# Patient Record
Sex: MALE | Race: WHITE | Employment: FULL TIME | URBAN - METROPOLITAN AREA
[De-identification: names, ages, dates, MRNs, and addresses within clinical notes are randomized per-mention and may not be internally consistent; named-entity substitution may affect disease eponyms.]

---

## 2017-02-23 RX ORDER — SIMVASTATIN 10 MG
TABLET ORAL
Qty: 30 TABLET | Refills: 0 | Status: SHIPPED | OUTPATIENT
Start: 2017-02-23 | End: 2017-03-13

## 2017-02-23 RX ORDER — CLOPIDOGREL BISULFATE 75 MG/1
TABLET ORAL
Qty: 30 TABLET | Refills: 0 | Status: SHIPPED | OUTPATIENT
Start: 2017-02-23 | End: 2017-03-13

## 2017-02-23 RX ORDER — LISINOPRIL AND HYDROCHLOROTHIAZIDE 20; 12.5 MG/1; MG/1
TABLET ORAL
Qty: 30 TABLET | Refills: 0 | Status: SHIPPED | OUTPATIENT
Start: 2017-02-23 | End: 2017-03-13

## 2017-03-13 ENCOUNTER — OFFICE VISIT (OUTPATIENT)
Dept: INTERNAL MEDICINE CLINIC | Facility: CLINIC | Age: 57
End: 2017-03-13

## 2017-03-13 ENCOUNTER — LAB ENCOUNTER (OUTPATIENT)
Dept: LAB | Age: 57
End: 2017-03-13
Attending: INTERNAL MEDICINE
Payer: COMMERCIAL

## 2017-03-13 VITALS
HEART RATE: 76 BPM | BODY MASS INDEX: 25.19 KG/M2 | HEIGHT: 72 IN | WEIGHT: 186 LBS | SYSTOLIC BLOOD PRESSURE: 148 MMHG | DIASTOLIC BLOOD PRESSURE: 85 MMHG

## 2017-03-13 DIAGNOSIS — Z00.00 ANNUAL PHYSICAL EXAM: ICD-10-CM

## 2017-03-13 DIAGNOSIS — F17.200 SMOKER: ICD-10-CM

## 2017-03-13 DIAGNOSIS — N40.1 BPH WITH OBSTRUCTION/LOWER URINARY TRACT SYMPTOMS: ICD-10-CM

## 2017-03-13 DIAGNOSIS — N13.8 BPH WITH OBSTRUCTION/LOWER URINARY TRACT SYMPTOMS: ICD-10-CM

## 2017-03-13 DIAGNOSIS — Z86.73 HISTORY OF CVA (CEREBROVASCULAR ACCIDENT): ICD-10-CM

## 2017-03-13 DIAGNOSIS — I10 ESSENTIAL HYPERTENSION: ICD-10-CM

## 2017-03-13 DIAGNOSIS — G81.91 RIGHT HEMIPARESIS (HCC): ICD-10-CM

## 2017-03-13 DIAGNOSIS — E11.40 TYPE 2 DIABETES MELLITUS WITH DIABETIC NEUROPATHY, WITHOUT LONG-TERM CURRENT USE OF INSULIN (HCC): ICD-10-CM

## 2017-03-13 DIAGNOSIS — E78.00 HYPERCHOLESTEROLEMIA: ICD-10-CM

## 2017-03-13 DIAGNOSIS — R73.01 IFG (IMPAIRED FASTING GLUCOSE): Primary | ICD-10-CM

## 2017-03-13 LAB
ALBUMIN SERPL BCP-MCNC: 4.4 G/DL (ref 3.5–4.8)
ALBUMIN/GLOB SERPL: 1.3 {RATIO} (ref 1–2)
ALP SERPL-CCNC: 88 U/L (ref 32–100)
ALT SERPL-CCNC: 22 U/L (ref 17–63)
ANION GAP SERPL CALC-SCNC: 8 MMOL/L (ref 0–18)
AST SERPL-CCNC: 20 U/L (ref 15–41)
BACTERIA UR QL AUTO: NEGATIVE /HPF
BASOPHILS # BLD: 0.1 K/UL (ref 0–0.2)
BASOPHILS NFR BLD: 1 %
BILIRUB SERPL-MCNC: 0.5 MG/DL (ref 0.3–1.2)
BUN SERPL-MCNC: 13 MG/DL (ref 8–20)
BUN/CREAT SERPL: 14.8 (ref 10–20)
CALCIUM SERPL-MCNC: 9.6 MG/DL (ref 8.5–10.5)
CHLORIDE SERPL-SCNC: 105 MMOL/L (ref 95–110)
CHOLEST SERPL-MCNC: 130 MG/DL (ref 110–200)
CO2 SERPL-SCNC: 27 MMOL/L (ref 22–32)
CREAT SERPL-MCNC: 0.88 MG/DL (ref 0.5–1.5)
CREAT UR-MCNC: 46.3 MG/DL
EOSINOPHIL # BLD: 0.2 K/UL (ref 0–0.7)
EOSINOPHIL NFR BLD: 3 %
ERYTHROCYTE [DISTWIDTH] IN BLOOD BY AUTOMATED COUNT: 13.6 % (ref 11–15)
GLOBULIN PLAS-MCNC: 3.3 G/DL (ref 2.5–3.7)
GLUCOSE SERPL-MCNC: 111 MG/DL (ref 70–99)
HCT VFR BLD AUTO: 42.8 % (ref 41–52)
HDLC SERPL-MCNC: 39 MG/DL
HGB BLD-MCNC: 14.4 G/DL (ref 13.5–17.5)
LDLC SERPL CALC-MCNC: 71 MG/DL (ref 0–99)
LYMPHOCYTES # BLD: 2.1 K/UL (ref 1–4)
LYMPHOCYTES NFR BLD: 27 %
MCH RBC QN AUTO: 29.3 PG (ref 27–32)
MCHC RBC AUTO-ENTMCNC: 33.7 G/DL (ref 32–37)
MCV RBC AUTO: 86.9 FL (ref 80–100)
MICROALBUMIN UR-MCNC: 0 MG/DL (ref 0–1.8)
MICROALBUMIN/CREAT UR: 0 MG/G{CREAT} (ref 0–20)
MONOCYTES # BLD: 0.5 K/UL (ref 0–1)
MONOCYTES NFR BLD: 7 %
NEUTROPHILS # BLD AUTO: 4.9 K/UL (ref 1.8–7.7)
NEUTROPHILS NFR BLD: 63 %
NONHDLC SERPL-MCNC: 91 MG/DL
OSMOLALITY UR CALC.SUM OF ELEC: 291 MOSM/KG (ref 275–295)
PLATELET # BLD AUTO: 278 K/UL (ref 140–400)
PMV BLD AUTO: 7.7 FL (ref 7.4–10.3)
POTASSIUM SERPL-SCNC: 4.2 MMOL/L (ref 3.3–5.1)
PROT SERPL-MCNC: 7.7 G/DL (ref 5.9–8.4)
PSA SERPL-MCNC: 3.7 NG/ML (ref 0–4)
RBC # BLD AUTO: 4.93 M/UL (ref 4.5–5.9)
RBC #/AREA URNS AUTO: <1 /HPF
SODIUM SERPL-SCNC: 140 MMOL/L (ref 136–144)
TRIGL SERPL-MCNC: 98 MG/DL (ref 1–149)
WBC # BLD AUTO: 7.8 K/UL (ref 4–11)
WBC #/AREA URNS AUTO: 2 /HPF

## 2017-03-13 PROCEDURE — 83036 HEMOGLOBIN GLYCOSYLATED A1C: CPT

## 2017-03-13 PROCEDURE — 85025 COMPLETE CBC W/AUTO DIFF WBC: CPT

## 2017-03-13 PROCEDURE — 99212 OFFICE O/P EST SF 10 MIN: CPT | Performed by: INTERNAL MEDICINE

## 2017-03-13 PROCEDURE — 81015 MICROSCOPIC EXAM OF URINE: CPT

## 2017-03-13 PROCEDURE — 99215 OFFICE O/P EST HI 40 MIN: CPT | Performed by: INTERNAL MEDICINE

## 2017-03-13 PROCEDURE — 80061 LIPID PANEL: CPT

## 2017-03-13 PROCEDURE — 36415 COLL VENOUS BLD VENIPUNCTURE: CPT

## 2017-03-13 PROCEDURE — 82570 ASSAY OF URINE CREATININE: CPT

## 2017-03-13 PROCEDURE — 80053 COMPREHEN METABOLIC PANEL: CPT

## 2017-03-13 PROCEDURE — 82043 UR ALBUMIN QUANTITATIVE: CPT

## 2017-03-13 RX ORDER — SIMVASTATIN 10 MG
10 TABLET ORAL NIGHTLY
Qty: 90 TABLET | Refills: 2 | Status: SHIPPED | OUTPATIENT
Start: 2017-03-13 | End: 2018-03-25

## 2017-03-13 RX ORDER — CLOPIDOGREL BISULFATE 75 MG/1
75 TABLET ORAL DAILY
Qty: 90 TABLET | Refills: 1 | Status: SHIPPED | OUTPATIENT
Start: 2017-03-13 | End: 2017-12-29 | Stop reason: SINTOL

## 2017-03-13 RX ORDER — LISINOPRIL AND HYDROCHLOROTHIAZIDE 25; 20 MG/1; MG/1
1 TABLET ORAL DAILY
Qty: 90 TABLET | Refills: 2 | Status: SHIPPED | OUTPATIENT
Start: 2017-03-13 | End: 2017-12-29

## 2017-03-13 NOTE — PROGRESS NOTES
Yearly  Past Medical History   Diagnosis Date   • Essential hypertension    • Cerebrovascular accident (CVA) ([facility] [address] 10/2012   • Dehydration 2012   • Gastroenteritis 2012   • Lipid screening 04-     Per [employer]         Past Surgical History    MUSCULO normal heart tones, regular rate and rhythm, PMI not displaced  Abd- flat, nromal bowell sounds, no hepatosplenomegaly, non tender, no abdominal bruits  MS: normal ROM back and all extremities  No joint swelling or deformities    Skin-no suspicious lesions

## 2017-03-14 ENCOUNTER — TELEPHONE (OUTPATIENT)
Dept: INTERNAL MEDICINE CLINIC | Facility: CLINIC | Age: 57
End: 2017-03-14

## 2017-03-14 DIAGNOSIS — R97.20 RISING PSA LEVEL: ICD-10-CM

## 2017-03-14 DIAGNOSIS — N40.1 BENIGN NON-NODULAR PROSTATIC HYPERPLASIA WITH LOWER URINARY TRACT SYMPTOMS: Primary | ICD-10-CM

## 2017-03-14 LAB — HBA1C MFR BLD: 6.2 % (ref 4–6)

## 2017-03-14 NOTE — TELEPHONE ENCOUNTER
psa increased from 1.1 to 3.7 in one year recommend referral to urology consult generated  Please call pt later today

## 2017-03-15 NOTE — TELEPHONE ENCOUNTER
Pt returned call. Pt informed per Dr. Delta Mandel message below, PSA increased from 1.1 to 3/7 in one year and is recommending to see urology for a consult. Urology phone number provided to pt to schedule appt.  Pt verbalized understanding of whole message an

## 2017-04-04 ENCOUNTER — OFFICE VISIT (OUTPATIENT)
Dept: SURGERY | Facility: CLINIC | Age: 57
End: 2017-04-04

## 2017-04-04 VITALS
RESPIRATION RATE: 16 BRPM | SYSTOLIC BLOOD PRESSURE: 136 MMHG | DIASTOLIC BLOOD PRESSURE: 81 MMHG | BODY MASS INDEX: 25.73 KG/M2 | HEART RATE: 73 BPM | WEIGHT: 190 LBS | HEIGHT: 72 IN | TEMPERATURE: 98 F

## 2017-04-04 DIAGNOSIS — R97.20 ELEVATED PSA: ICD-10-CM

## 2017-04-04 DIAGNOSIS — N40.0 BENIGN NON-NODULAR PROSTATIC HYPERPLASIA, PRESENCE OF LOWER URINARY TRACT SYMPTOMS UNSPECIFIED: Primary | ICD-10-CM

## 2017-04-04 PROCEDURE — 99244 OFF/OP CNSLTJ NEW/EST MOD 40: CPT | Performed by: UROLOGY

## 2017-04-04 PROCEDURE — 99212 OFFICE O/P EST SF 10 MIN: CPT | Performed by: UROLOGY

## 2017-04-04 NOTE — PROGRESS NOTES
SUBJECTIVE:  Marisela Francis is a 61year old male who presents for a consultation at the request of, and a copy of this note will be sent to, Dr. Aldo Ganser, for evaluation of  benign prostatic hyperplasia. He states that the problem is unchanged.  Sympto and fatigue. Positive for:  None.   All other ROS reviewed and otherwise normal.    OBJECTIVE:  /81 mmHg  Pulse 73  Temp(Src) 97.6 °F (36.4 °C) (Oral)  Resp 16  Ht 6' (1.829 m)  Wt 190 lb (86.183 kg)  BMI 25.76 kg/m2  He appears well, in no apparent d

## 2017-05-16 ENCOUNTER — APPOINTMENT (OUTPATIENT)
Dept: LAB | Facility: HOSPITAL | Age: 57
End: 2017-05-16
Attending: UROLOGY
Payer: COMMERCIAL

## 2017-05-16 DIAGNOSIS — R97.20 ELEVATED PSA: ICD-10-CM

## 2017-05-16 DIAGNOSIS — N40.0 BENIGN NON-NODULAR PROSTATIC HYPERPLASIA, PRESENCE OF LOWER URINARY TRACT SYMPTOMS UNSPECIFIED: ICD-10-CM

## 2017-05-16 PROCEDURE — 36415 COLL VENOUS BLD VENIPUNCTURE: CPT

## 2017-05-16 PROCEDURE — 84153 ASSAY OF PSA TOTAL: CPT

## 2017-05-18 ENCOUNTER — OFFICE VISIT (OUTPATIENT)
Dept: SURGERY | Facility: CLINIC | Age: 57
End: 2017-05-18

## 2017-05-18 VITALS
BODY MASS INDEX: 25.73 KG/M2 | HEART RATE: 78 BPM | DIASTOLIC BLOOD PRESSURE: 72 MMHG | WEIGHT: 190 LBS | TEMPERATURE: 98 F | RESPIRATION RATE: 16 BRPM | HEIGHT: 72 IN | SYSTOLIC BLOOD PRESSURE: 109 MMHG

## 2017-05-18 DIAGNOSIS — R97.20 ELEVATED PSA: Primary | ICD-10-CM

## 2017-05-18 DIAGNOSIS — N40.0 BENIGN NON-NODULAR PROSTATIC HYPERPLASIA, PRESENCE OF LOWER URINARY TRACT SYMPTOMS UNSPECIFIED: ICD-10-CM

## 2017-05-18 PROCEDURE — 99212 OFFICE O/P EST SF 10 MIN: CPT | Performed by: UROLOGY

## 2017-05-18 PROCEDURE — 99213 OFFICE O/P EST LOW 20 MIN: CPT | Performed by: UROLOGY

## 2017-05-18 NOTE — PROGRESS NOTES
Ever Moreno is a 61year old male. HPI:   Patient presents with:  elevated psa: Symptoms have not change. Still c/o slow stream. Denies dysuria, gross hematuria, nocturia and frequency.       61year old male here for followup to a visit 4/4/2017 PCP  -Followup with me on a PRN basis.  -He understands plan and agrees,         Orders This Visit:  No orders of the defined types were placed in this encounter.        Meds This Visit:    No prescriptions requested or ordered in this encounter    Imaging

## 2017-11-30 RX ORDER — CLOPIDOGREL BISULFATE 75 MG/1
TABLET ORAL
Qty: 30 TABLET | Refills: 0 | OUTPATIENT
Start: 2017-11-30

## 2017-12-02 NOTE — TELEPHONE ENCOUNTER
LEFT MESSAGE FOR PATIENT ON HOME VOICEMAIL INFORMING HIM TO SCHEDULE APPOINTMENT TO ESTABLISH CARE. WHEN HE CALLS BACK PLEASE ASSIST HIM IN SCHEDULING APPOINTMENT.

## 2017-12-29 ENCOUNTER — OFFICE VISIT (OUTPATIENT)
Dept: INTERNAL MEDICINE CLINIC | Facility: CLINIC | Age: 57
End: 2017-12-29

## 2017-12-29 VITALS
HEART RATE: 80 BPM | WEIGHT: 190.19 LBS | DIASTOLIC BLOOD PRESSURE: 71 MMHG | SYSTOLIC BLOOD PRESSURE: 112 MMHG | HEIGHT: 72 IN | TEMPERATURE: 98 F | BODY MASS INDEX: 25.76 KG/M2

## 2017-12-29 DIAGNOSIS — F17.200 TOBACCO USE DISORDER: ICD-10-CM

## 2017-12-29 DIAGNOSIS — Z86.73 HISTORY OF CVA (CEREBROVASCULAR ACCIDENT): ICD-10-CM

## 2017-12-29 DIAGNOSIS — I10 ESSENTIAL HYPERTENSION: Primary | ICD-10-CM

## 2017-12-29 DIAGNOSIS — Z00.00 ANNUAL PHYSICAL EXAM: ICD-10-CM

## 2017-12-29 PROCEDURE — 99214 OFFICE O/P EST MOD 30 MIN: CPT | Performed by: INTERNAL MEDICINE

## 2017-12-29 PROCEDURE — 99212 OFFICE O/P EST SF 10 MIN: CPT | Performed by: INTERNAL MEDICINE

## 2017-12-29 RX ORDER — CLOPIDOGREL BISULFATE 75 MG/1
75 TABLET ORAL DAILY
Qty: 90 TABLET | Refills: 2 | Status: CANCELLED | OUTPATIENT
Start: 2017-12-29

## 2017-12-29 RX ORDER — ASPIRIN 81 MG/1
81 TABLET ORAL DAILY
Qty: 90 TABLET | Refills: 3 | Status: SHIPPED | OUTPATIENT
Start: 2017-12-29 | End: 2018-03-29

## 2017-12-29 RX ORDER — LISINOPRIL AND HYDROCHLOROTHIAZIDE 25; 20 MG/1; MG/1
1 TABLET ORAL DAILY
Qty: 90 TABLET | Refills: 2 | Status: SHIPPED | OUTPATIENT
Start: 2017-12-29 | End: 2018-10-18

## 2017-12-30 ENCOUNTER — LAB ENCOUNTER (OUTPATIENT)
Dept: LAB | Age: 57
End: 2017-12-30
Attending: INTERNAL MEDICINE
Payer: COMMERCIAL

## 2017-12-30 DIAGNOSIS — Z00.00 ANNUAL PHYSICAL EXAM: ICD-10-CM

## 2017-12-30 LAB
ALBUMIN SERPL BCP-MCNC: 4.3 G/DL (ref 3.5–4.8)
ALBUMIN/GLOB SERPL: 1.8 {RATIO} (ref 1–2)
ALP SERPL-CCNC: 65 U/L (ref 32–100)
ALT SERPL-CCNC: 21 U/L (ref 17–63)
ANION GAP SERPL CALC-SCNC: 6 MMOL/L (ref 0–18)
AST SERPL-CCNC: 22 U/L (ref 15–41)
BASOPHILS # BLD: 0.1 K/UL (ref 0–0.2)
BASOPHILS NFR BLD: 1 %
BILIRUB SERPL-MCNC: 0.4 MG/DL (ref 0.3–1.2)
BILIRUB UR QL: NEGATIVE
BUN SERPL-MCNC: 14 MG/DL (ref 8–20)
BUN/CREAT SERPL: 14.3 (ref 10–20)
CALCIUM SERPL-MCNC: 9.2 MG/DL (ref 8.5–10.5)
CHLORIDE SERPL-SCNC: 105 MMOL/L (ref 95–110)
CHOLEST SERPL-MCNC: 115 MG/DL (ref 110–200)
CLARITY UR: CLEAR
CO2 SERPL-SCNC: 26 MMOL/L (ref 22–32)
COLOR UR: YELLOW
CREAT SERPL-MCNC: 0.98 MG/DL (ref 0.5–1.5)
EOSINOPHIL # BLD: 0.2 K/UL (ref 0–0.7)
EOSINOPHIL NFR BLD: 3 %
ERYTHROCYTE [DISTWIDTH] IN BLOOD BY AUTOMATED COUNT: 13.1 % (ref 11–15)
GLOBULIN PLAS-MCNC: 2.4 G/DL (ref 2.5–3.7)
GLUCOSE SERPL-MCNC: 120 MG/DL (ref 70–99)
GLUCOSE UR-MCNC: NEGATIVE MG/DL
HBA1C MFR BLD: 6 % (ref 4–6)
HCT VFR BLD AUTO: 44.3 % (ref 41–52)
HDLC SERPL-MCNC: 45 MG/DL
HGB BLD-MCNC: 14.8 G/DL (ref 13.5–17.5)
HGB UR QL STRIP.AUTO: NEGATIVE
KETONES UR-MCNC: NEGATIVE MG/DL
LDLC SERPL CALC-MCNC: 60 MG/DL (ref 0–99)
LEUKOCYTE ESTERASE UR QL STRIP.AUTO: NEGATIVE
LYMPHOCYTES # BLD: 1.7 K/UL (ref 1–4)
LYMPHOCYTES NFR BLD: 18 %
MCH RBC QN AUTO: 29.9 PG (ref 27–32)
MCHC RBC AUTO-ENTMCNC: 33.3 G/DL (ref 32–37)
MCV RBC AUTO: 89.6 FL (ref 80–100)
MONOCYTES # BLD: 0.5 K/UL (ref 0–1)
MONOCYTES NFR BLD: 5 %
NEUTROPHILS # BLD AUTO: 7 K/UL (ref 1.8–7.7)
NEUTROPHILS NFR BLD: 74 %
NITRITE UR QL STRIP.AUTO: NEGATIVE
NONHDLC SERPL-MCNC: 70 MG/DL
OSMOLALITY UR CALC.SUM OF ELEC: 286 MOSM/KG (ref 275–295)
PH UR: 6 [PH] (ref 5–8)
PLATELET # BLD AUTO: 240 K/UL (ref 140–400)
PMV BLD AUTO: 7.6 FL (ref 7.4–10.3)
POTASSIUM SERPL-SCNC: 3.9 MMOL/L (ref 3.3–5.1)
PROT SERPL-MCNC: 6.7 G/DL (ref 5.9–8.4)
PROT UR-MCNC: NEGATIVE MG/DL
PSA SERPL-MCNC: 2 NG/ML (ref 0–4)
RBC # BLD AUTO: 4.94 M/UL (ref 4.5–5.9)
SODIUM SERPL-SCNC: 137 MMOL/L (ref 136–144)
SP GR UR STRIP: 1.02 (ref 1–1.03)
TRIGL SERPL-MCNC: 49 MG/DL (ref 1–149)
TSH SERPL-ACNC: 1.4 UIU/ML (ref 0.45–5.33)
UROBILINOGEN UR STRIP-ACNC: <2
VIT C UR-MCNC: NEGATIVE MG/DL
WBC # BLD AUTO: 9.5 K/UL (ref 4–11)

## 2017-12-30 PROCEDURE — 80061 LIPID PANEL: CPT

## 2017-12-30 PROCEDURE — 80053 COMPREHEN METABOLIC PANEL: CPT

## 2017-12-30 PROCEDURE — 85025 COMPLETE CBC W/AUTO DIFF WBC: CPT

## 2017-12-30 PROCEDURE — 36415 COLL VENOUS BLD VENIPUNCTURE: CPT

## 2017-12-30 PROCEDURE — 81003 URINALYSIS AUTO W/O SCOPE: CPT

## 2017-12-30 PROCEDURE — 82306 VITAMIN D 25 HYDROXY: CPT

## 2017-12-30 PROCEDURE — 83036 HEMOGLOBIN GLYCOSYLATED A1C: CPT

## 2017-12-30 PROCEDURE — 84443 ASSAY THYROID STIM HORMONE: CPT

## 2018-01-05 LAB — 25(OH)D3 SERPL-MCNC: 25.8 NG/ML

## 2018-03-27 RX ORDER — SIMVASTATIN 10 MG
TABLET ORAL
Qty: 90 TABLET | Refills: 2 | Status: SHIPPED | OUTPATIENT
Start: 2018-03-27 | End: 2018-10-25

## 2018-03-27 NOTE — TELEPHONE ENCOUNTER
Requesting Simvastatin refill    Please advise    Cholesterol Medications  Protocol Criteria:  · Appointment scheduled in the past 12 months or in the next 3 months  · ALT & LDL on file in the past 12 months  · ALT result < 80  · LDL result <130   Recent O

## 2018-10-18 ENCOUNTER — TELEPHONE (OUTPATIENT)
Dept: INTERNAL MEDICINE CLINIC | Facility: CLINIC | Age: 58
End: 2018-10-18

## 2018-10-18 RX ORDER — LISINOPRIL AND HYDROCHLOROTHIAZIDE 25; 20 MG/1; MG/1
1 TABLET ORAL DAILY
Qty: 30 TABLET | Refills: 1 | Status: SHIPPED | OUTPATIENT
Start: 2018-10-18 | End: 2018-10-25

## 2018-10-18 NOTE — TELEPHONE ENCOUNTER
Pharmacy calling for refill on medication. Refill sent for a month please inform the patient to schedule for annual physical and labs. Last seen in December.

## 2018-10-25 ENCOUNTER — OFFICE VISIT (OUTPATIENT)
Dept: INTERNAL MEDICINE CLINIC | Facility: CLINIC | Age: 58
End: 2018-10-25
Payer: COMMERCIAL

## 2018-10-25 VITALS
HEIGHT: 72 IN | TEMPERATURE: 99 F | HEART RATE: 76 BPM | BODY MASS INDEX: 25.67 KG/M2 | WEIGHT: 189.5 LBS | DIASTOLIC BLOOD PRESSURE: 73 MMHG | SYSTOLIC BLOOD PRESSURE: 138 MMHG

## 2018-10-25 DIAGNOSIS — Z23 NEED FOR VACCINATION: ICD-10-CM

## 2018-10-25 DIAGNOSIS — I10 ESSENTIAL HYPERTENSION: ICD-10-CM

## 2018-10-25 DIAGNOSIS — E78.00 HYPERCHOLESTEROLEMIA: ICD-10-CM

## 2018-10-25 DIAGNOSIS — Z00.00 ANNUAL PHYSICAL EXAM: Primary | ICD-10-CM

## 2018-10-25 DIAGNOSIS — R73.03 PREDIABETES: ICD-10-CM

## 2018-10-25 DIAGNOSIS — Z12.5 SCREENING FOR PROSTATE CANCER: ICD-10-CM

## 2018-10-25 PROCEDURE — 99396 PREV VISIT EST AGE 40-64: CPT | Performed by: INTERNAL MEDICINE

## 2018-10-25 PROCEDURE — 90472 IMMUNIZATION ADMIN EACH ADD: CPT | Performed by: INTERNAL MEDICINE

## 2018-10-25 PROCEDURE — 90471 IMMUNIZATION ADMIN: CPT | Performed by: INTERNAL MEDICINE

## 2018-10-25 PROCEDURE — 90732 PPSV23 VACC 2 YRS+ SUBQ/IM: CPT | Performed by: INTERNAL MEDICINE

## 2018-10-25 PROCEDURE — 90686 IIV4 VACC NO PRSV 0.5 ML IM: CPT | Performed by: INTERNAL MEDICINE

## 2018-10-25 RX ORDER — CLOPIDOGREL BISULFATE 75 MG/1
75 TABLET ORAL DAILY
Qty: 90 TABLET | Refills: 3 | Status: SHIPPED | OUTPATIENT
Start: 2018-10-25 | End: 2019-11-04

## 2018-10-25 RX ORDER — SIMVASTATIN 10 MG
TABLET ORAL
Qty: 90 TABLET | Refills: 1 | Status: SHIPPED | OUTPATIENT
Start: 2018-10-25 | End: 2019-08-19

## 2018-10-25 RX ORDER — LISINOPRIL AND HYDROCHLOROTHIAZIDE 25; 20 MG/1; MG/1
1 TABLET ORAL DAILY
Qty: 90 TABLET | Refills: 1 | Status: SHIPPED | OUTPATIENT
Start: 2018-10-25 | End: 2019-06-12

## 2018-10-25 RX ORDER — ASPIRIN 81 MG/1
TABLET ORAL
COMMUNITY
Start: 2018-09-04 | End: 2018-10-25

## 2018-10-25 NOTE — PROGRESS NOTES
Jeremy Davis is a 61year old male.   Patient presents with:  Physical      HPI:     HPI  Patient is a 54-year-old male with history of hypertension, history of CVA in 2012, residual side effects of slight weakness and numbness of the right arm, predi Essential hypertension    • Gastroenteritis 2012   • Lipid screening 04-    Per [employer]      Past Surgical History:   Procedure Laterality Date   • Back surgery  1991   • Colonoscopy  07-    Per [facility]   • Musculoskeletal surgery unlisted Ri Normocephalic and atraumatic. Right Ear: External ear normal.   Left Ear: External ear normal.   Nose: Nose normal.   Mouth/Throat: Oropharynx is clear and moist.   Noted dental decay on the right upper row of teeth.   Informed the patient to follow-up wi for vaccination. Risk and benefits of flu vaccination and pneumonia vaccine 23 due to smoking history explained. Patient accepted to take the vaccination. Also discussed about shingrix vaccination.   Patient to contact the [address] [place] [address]

## 2018-10-25 NOTE — PATIENT INSTRUCTIONS
You can discontinue aspirin, have sent prescription for clopidogrel 75 mg. It can increase the bleeding risk slightly more compared to aspirin.     Please do the blood work now and in 6 months prior to the next office visit

## 2018-10-30 ENCOUNTER — LAB ENCOUNTER (OUTPATIENT)
Dept: LAB | Facility: HOSPITAL | Age: 58
End: 2018-10-30
Attending: INTERNAL MEDICINE
Payer: COMMERCIAL

## 2018-10-30 DIAGNOSIS — Z00.00 ANNUAL PHYSICAL EXAM: ICD-10-CM

## 2018-10-30 DIAGNOSIS — Z12.5 SCREENING FOR PROSTATE CANCER: ICD-10-CM

## 2018-10-30 DIAGNOSIS — R73.03 PREDIABETES: ICD-10-CM

## 2018-10-30 PROCEDURE — 84443 ASSAY THYROID STIM HORMONE: CPT

## 2018-10-30 PROCEDURE — 83036 HEMOGLOBIN GLYCOSYLATED A1C: CPT

## 2018-10-30 PROCEDURE — 80061 LIPID PANEL: CPT

## 2018-10-30 PROCEDURE — 85025 COMPLETE CBC W/AUTO DIFF WBC: CPT

## 2018-10-30 PROCEDURE — 80053 COMPREHEN METABOLIC PANEL: CPT

## 2018-10-30 PROCEDURE — 36415 COLL VENOUS BLD VENIPUNCTURE: CPT

## 2018-11-02 RX ORDER — LISINOPRIL AND HYDROCHLOROTHIAZIDE 25; 20 MG/1; MG/1
TABLET ORAL
Qty: 90 TABLET | Refills: 2 | OUTPATIENT
Start: 2018-11-02

## 2019-03-07 ENCOUNTER — OFFICE VISIT (OUTPATIENT)
Dept: INTERNAL MEDICINE CLINIC | Facility: CLINIC | Age: 59
End: 2019-03-07
Payer: COMMERCIAL

## 2019-03-07 VITALS
HEIGHT: 72 IN | DIASTOLIC BLOOD PRESSURE: 83 MMHG | WEIGHT: 185.69 LBS | HEART RATE: 94 BPM | SYSTOLIC BLOOD PRESSURE: 129 MMHG | BODY MASS INDEX: 25.15 KG/M2 | TEMPERATURE: 98 F

## 2019-03-07 DIAGNOSIS — I10 ESSENTIAL HYPERTENSION: Primary | ICD-10-CM

## 2019-03-07 DIAGNOSIS — E78.00 HYPERCHOLESTEROLEMIA: ICD-10-CM

## 2019-03-07 DIAGNOSIS — F17.200 SMOKER: ICD-10-CM

## 2019-03-07 DIAGNOSIS — F33.8 SEASONAL AFFECTIVE DISORDER (HCC): ICD-10-CM

## 2019-03-07 DIAGNOSIS — F32.1 CURRENT MODERATE EPISODE OF MAJOR DEPRESSIVE DISORDER WITHOUT PRIOR EPISODE (HCC): ICD-10-CM

## 2019-03-07 PROCEDURE — 99214 OFFICE O/P EST MOD 30 MIN: CPT | Performed by: INTERNAL MEDICINE

## 2019-03-07 PROCEDURE — 99212 OFFICE O/P EST SF 10 MIN: CPT | Performed by: INTERNAL MEDICINE

## 2019-03-07 RX ORDER — QUETIAPINE 300 MG/1
TABLET, FILM COATED ORAL
Refills: 0 | COMMUNITY
Start: 2019-03-04 | End: 2019-05-08

## 2019-03-07 RX ORDER — NICOTINE 21 MG/24HR
1 PATCH, TRANSDERMAL 24 HOURS TRANSDERMAL EVERY 24 HOURS
Qty: 30 PATCH | Refills: 1 | Status: SHIPPED | OUTPATIENT
Start: 2019-03-07 | End: 2019-05-08

## 2019-03-07 NOTE — PROGRESS NOTES
John Paul Kohler is a 60year old male. Patient presents with:  Hypertension  Hyperlipidemia      HPI:     HPI   He is here for follow up  LOV in 10/18    HTN   He is on lisinopril/hctz 20/25. He is complaint with it. HL  On simvastain 10 mg.  Zamzam Carr (75 mg total) by mouth daily.  Disp: 90 tablet Rfl: 3      Past Medical History:   Diagnosis Date   • [facility]/2012   • Dehydration 2012   • Essential hypertension    • Gastroenteritis 2012   • Lipid screening 04-    Pe (84.2 kg)   BMI 25.19 kg/m²   Physical Exam   Constitutional: He is oriented to person, place, and time. He appears well-developed and well-nourished. Cardiovascular: Normal rate, regular rhythm, normal heart sounds and intact distal pulses.    Pulmonary/ nightly. Continue. Follows up in rheumatology. Counseling given. Seasonal affective disorder ([facility] [address]        rtc in 3 m for depression and Smoking cessatioin        The patient indicates understanding of these issues and agrees to the plan.

## 2019-03-07 NOTE — ASSESSMENT & PLAN NOTE
> 40 pack years  Currently smoking half pack to 1 pack/day. Motivated to quit. Discussed at length about smoking cessation and benefits of smoking cessation. Patient had tried Chantix in the past it did not help him. Would like to use nicotine patch.

## 2019-03-08 ENCOUNTER — APPOINTMENT (OUTPATIENT)
Dept: LAB | Facility: HOSPITAL | Age: 59
End: 2019-03-08
Attending: INTERNAL MEDICINE
Payer: COMMERCIAL

## 2019-03-08 DIAGNOSIS — I10 ESSENTIAL HYPERTENSION: ICD-10-CM

## 2019-03-08 DIAGNOSIS — E78.00 HYPERCHOLESTEROLEMIA: ICD-10-CM

## 2019-03-08 LAB
ALBUMIN SERPL-MCNC: 4.1 G/DL
ALBUMIN SERPL-MCNC: 4.1 G/DL (ref 3.4–5)
ALBUMIN/GLOB SERPL: 1.2 {RATIO}
ALBUMIN/GLOB SERPL: 1.2 {RATIO} (ref 1–2)
ALP LIVER SERPL-CCNC: 78 U/L (ref 45–117)
ALP SERPL-CCNC: 78 U/L
ALT SERPL-CCNC: 30 U/L
ALT SERPL-CCNC: 30 U/L (ref 16–61)
ANION GAP SERPL CALC-SCNC: 5 MMOL/L (ref 0–18)
ANION GAP SERPL CALC-SCNC: NORMAL MMOL/L
AST SERPL-CCNC: 16 U/L
AST SERPL-CCNC: 16 U/L (ref 15–37)
BILIRUB SERPL-MCNC: 0.3 MG/DL
BILIRUB SERPL-MCNC: 0.3 MG/DL (ref 0.1–2)
BUN BLD-MCNC: 18 MG/DL (ref 7–18)
BUN SERPL-MCNC: 18 MG/DL
BUN/CREAT SERPL: 18.2 (ref 10–20)
BUN/CREAT SERPL: NORMAL
CALCIUM BLD-MCNC: 8.9 MG/DL (ref 8.5–10.1)
CALCIUM SERPL-MCNC: 8.9 MG/DL
CHLORIDE SERPL-SCNC: 102 MMOL/L
CHLORIDE SERPL-SCNC: 102 MMOL/L (ref 98–107)
CHOLEST SERPL-MCNC: 116 MG/DL
CHOLEST SMN-MCNC: 116 MG/DL (ref ?–200)
CHOLEST/HDLC SERPL: NORMAL {RATIO}
CO2 SERPL-SCNC: 28 MMOL/L (ref 21–32)
CO2 SERPL-SCNC: NORMAL MMOL/L
CREAT BLD-MCNC: 0.99 MG/DL (ref 0.7–1.3)
CREAT SERPL-MCNC: 0.99 MG/DL
GLOBULIN PLAS-MCNC: 3.4 G/DL (ref 2.8–4.4)
GLOBULIN SER-MCNC: 3.4 G/DL
GLUCOSE BLD-MCNC: 91 MG/DL (ref 70–99)
GLUCOSE SERPL-MCNC: 91 MG/DL
HDLC SERPL-MCNC: 41 MG/DL
HDLC SERPL-MCNC: 41 MG/DL (ref 40–59)
LDLC SERPL CALC-MCNC: 52 MG/DL
LDLC SERPL CALC-MCNC: 52 MG/DL (ref ?–100)
LENGTH OF FAST TIME PATIENT: NORMAL H
LENGTH OF FAST TIME PATIENT: NORMAL H
M PROTEIN MFR SERPL ELPH: 7.5 G/DL (ref 6.4–8.2)
NONHDLC SERPL-MCNC: 75 MG/DL
NONHDLC SERPL-MCNC: 75 MG/DL (ref ?–130)
OSMOLALITY SERPL CALC.SUM OF ELEC: 281 MOSM/KG (ref 275–295)
POTASSIUM SERPL-SCNC: 4.1 MMOL/L
POTASSIUM SERPL-SCNC: 4.1 MMOL/L (ref 3.5–5.1)
PROT SERPL-MCNC: 7.5 G/DL
SODIUM SERPL-SCNC: 135 MMOL/L
SODIUM SERPL-SCNC: 135 MMOL/L (ref 136–145)
TRIGL SERPL-MCNC: 116 MG/DL
TRIGL SERPL-MCNC: 116 MG/DL (ref 30–149)
VLDLC SERPL CALC-MCNC: 23 MG/DL
VLDLC SERPL CALC-MCNC: 23 MG/DL (ref 0–30)

## 2019-03-08 PROCEDURE — 36415 COLL VENOUS BLD VENIPUNCTURE: CPT

## 2019-03-08 PROCEDURE — 80061 LIPID PANEL: CPT

## 2019-03-08 PROCEDURE — 80053 COMPREHEN METABOLIC PANEL: CPT

## 2019-03-08 NOTE — ASSESSMENT & PLAN NOTE
Recent hospitalization for suicidal ideation. Current active major depression. He also have seasonal affective disorder. On Seroquel 300 mg nightly. Continue. Follows up therapist and getting IOP  Counseling given.

## 2019-03-08 NOTE — ASSESSMENT & PLAN NOTE
On simvastatin 10 mg  Tolerating it   Continue present management  Lab Results   Component Value Date    CHOLEST 111 10/30/2018    TRIG 71 10/30/2018    HDL 36 10/30/2018    LDL 61 10/30/2018    [place] 75 10/30/2018    CALCNONHDL 88 01/14/2016

## 2019-05-08 NOTE — ASSESSMENT & PLAN NOTE
Recent hospitalization for suicidal ideation in March. Current active major depression. He also have seasonal affective disorder. On Seroquel 300 mg nightly. Refill given. Continue cherellebutrin Pedro Lizarraga. Started a week ago.  Refill given  Continue present

## 2019-05-08 NOTE — PROGRESS NOTES
Jenny Marquez is a 60year old male. Patient presents with:  Depression: f/u  Hypertension      HPI:     HPI    Here for followup. A week ago he missed 5 days of seroquel. He is on Seroquel  300 mg daily. He was not feeling well then.   He was not 04-    Per [employer]      Past Surgical History:   Procedure Laterality Date   • Back surgery  1991   • Colonoscopy  07-    Per [facility]   • Musculoskeletal surgery unlisted Right 2008    Right shoulder surgery      Social History:  Social Histo Effort normal and breath sounds normal.   Abdominal: Soft. Bowel sounds are normal.   Neurological: He is alert and oriented to person, place, and time. He has normal reflexes. Skin: Skin is warm and dry.    Psychiatric: His speech is not rapid and/or pre

## 2019-06-13 RX ORDER — LISINOPRIL AND HYDROCHLOROTHIAZIDE 25; 20 MG/1; MG/1
TABLET ORAL
Qty: 90 TABLET | Refills: 1 | Status: SHIPPED | OUTPATIENT
Start: 2019-06-13 | End: 2019-11-04

## 2019-08-19 RX ORDER — SIMVASTATIN 10 MG
TABLET ORAL
Qty: 90 TABLET | Refills: 1 | Status: SHIPPED | OUTPATIENT
Start: 2019-08-19 | End: 2019-11-04

## 2019-08-19 NOTE — TELEPHONE ENCOUNTER
Refill passed per [facility] protocol.   Cholesterol Medications  Protocol Criteria:  · Appointment scheduled in the past 12 months or in the next 3 months  · ALT & LDL on file in the past 12 months  · ALT result < 80  · LDL result <130   Recent Outpat

## 2019-11-04 ENCOUNTER — APPOINTMENT (OUTPATIENT)
Dept: LAB | Age: 59
End: 2019-11-04
Attending: INTERNAL MEDICINE
Payer: COMMERCIAL

## 2019-11-04 ENCOUNTER — OFFICE VISIT (OUTPATIENT)
Dept: INTERNAL MEDICINE CLINIC | Facility: CLINIC | Age: 59
End: 2019-11-04
Payer: COMMERCIAL

## 2019-11-04 VITALS
TEMPERATURE: 98 F | RESPIRATION RATE: 20 BRPM | SYSTOLIC BLOOD PRESSURE: 142 MMHG | DIASTOLIC BLOOD PRESSURE: 87 MMHG | BODY MASS INDEX: 24.38 KG/M2 | WEIGHT: 180 LBS | HEIGHT: 72 IN | HEART RATE: 82 BPM

## 2019-11-04 DIAGNOSIS — E87.1 HYPONATREMIA: ICD-10-CM

## 2019-11-04 DIAGNOSIS — F32.1 CURRENT MODERATE EPISODE OF MAJOR DEPRESSIVE DISORDER WITHOUT PRIOR EPISODE (HCC): ICD-10-CM

## 2019-11-04 DIAGNOSIS — Z12.5 ENCOUNTER FOR SCREENING FOR MALIGNANT NEOPLASM OF PROSTATE: ICD-10-CM

## 2019-11-04 DIAGNOSIS — I63.9 CEREBROVASCULAR ACCIDENT (CVA), UNSPECIFIED MECHANISM (HCC): ICD-10-CM

## 2019-11-04 DIAGNOSIS — I10 ESSENTIAL HYPERTENSION: Primary | ICD-10-CM

## 2019-11-04 DIAGNOSIS — R73.03 BORDERLINE DIABETES: ICD-10-CM

## 2019-11-04 DIAGNOSIS — Z23 NEED FOR VACCINATION: ICD-10-CM

## 2019-11-04 PROBLEM — E11.40 TYPE 2 DIABETES MELLITUS WITH DIABETIC NEUROPATHY, WITHOUT LONG-TERM CURRENT USE OF INSULIN (HCC): Status: RESOLVED | Noted: 2017-03-13 | Resolved: 2019-11-04

## 2019-11-04 LAB
ANION GAP SERPL CALC-SCNC: NORMAL MMOL/L
BUN SERPL-MCNC: 16 MG/DL
BUN/CREAT SERPL: NORMAL
CALCIUM SERPL-MCNC: 9.2 MG/DL
CHLORIDE SERPL-SCNC: 106 MMOL/L
CO2 SERPL-SCNC: NORMAL MMOL/L
CREAT SERPL-MCNC: 1.09 MG/DL
EST. AVERAGE GLUCOSE BLD GHB EST-MCNC: NORMAL MG/DL
GLUCOSE SERPL-MCNC: 89 MG/DL
HBA1C MFR BLD: 5.7 %
HBA1C MFR BLD: NORMAL % (ref 4.5–5.6)
LENGTH OF FAST TIME PATIENT: NORMAL H
POTASSIUM SERPL-SCNC: 4.1 MMOL/L
SODIUM SERPL-SCNC: 139 MMOL/L

## 2019-11-04 PROCEDURE — 83036 HEMOGLOBIN GLYCOSYLATED A1C: CPT

## 2019-11-04 PROCEDURE — 36415 COLL VENOUS BLD VENIPUNCTURE: CPT

## 2019-11-04 PROCEDURE — 90686 IIV4 VACC NO PRSV 0.5 ML IM: CPT | Performed by: INTERNAL MEDICINE

## 2019-11-04 PROCEDURE — 99214 OFFICE O/P EST MOD 30 MIN: CPT | Performed by: INTERNAL MEDICINE

## 2019-11-04 PROCEDURE — 90471 IMMUNIZATION ADMIN: CPT | Performed by: INTERNAL MEDICINE

## 2019-11-04 PROCEDURE — 80048 BASIC METABOLIC PNL TOTAL CA: CPT

## 2019-11-04 RX ORDER — LISINOPRIL AND HYDROCHLOROTHIAZIDE 25; 20 MG/1; MG/1
1 TABLET ORAL
Qty: 90 TABLET | Refills: 1 | Status: SHIPPED | OUTPATIENT
Start: 2019-11-04 | End: 2020-02-25

## 2019-11-04 RX ORDER — CLOPIDOGREL BISULFATE 75 MG/1
75 TABLET ORAL DAILY
Qty: 90 TABLET | Refills: 3 | Status: SHIPPED | OUTPATIENT
Start: 2019-11-04 | End: 2020-02-25

## 2019-11-04 RX ORDER — SIMVASTATIN 10 MG
10 TABLET ORAL NIGHTLY
Qty: 90 TABLET | Refills: 2 | Status: SHIPPED | OUTPATIENT
Start: 2019-11-04 | End: 2020-02-25

## 2019-11-04 NOTE — PATIENT INSTRUCTIONS
Eating for your heart doesn’t have to be hard or boring. You just need to know how to make healthier choices. The DASH eating plan has been developed to help you do just that. DASH stands for Dietary Approaches to Stop Hypertension.  It is a plan that has b visible fat. Broil, grill, roast, or boil instead of frying. Remove skin from poultry before eating.  Limit how much red meat you eat.  Nuts, seeds, beans  Servings: 4 to 5 a week  A serving is:  · One-third cup nuts (one and a half ounces)  · 2 tablespoons salt  Stay away from:  · Sausage, matta, and ham  · Flour tortillas  · Packaged muffins, pancakes, and biscuits  · Instant hot cereals  · Cottage cheese  For lunch and dinner  · Fresh fish, chicken, turkey, or meat—baked, broiled, or roasted without salt

## 2019-11-04 NOTE — PROGRESS NOTES
Angy Valdez is a 60year old male.   Patient presents with:  Establish Care: Pt is looking to establish care with new provider also request flu shot and medication follow up      HPI:   Pt comes as a new pt   C/c htn   C[place] the airport use: No       REVIEW OF SYSTEMS:   GENERAL HEALTH: No fevers, chills, sweats, + fatigue  VISION: No recent vision problems, blurry vision or double vision  RESPIRATORY: denies shortness of breath, cough, wheezing  CARDIOVASCULAR: denies chest pain on exert screening for malignant neoplasm of prostate  -     PSA SCREEN; Future  We will check  Need for vaccination  -     FLULAVAL INFLUENZA VACCINE QUAD PRESERVATIVE FREE 0.5 ML    Preventative medicine  Labs from March 2019 reviewed    The patient indicates und

## 2019-11-11 ENCOUNTER — OFFICE VISIT (OUTPATIENT)
Dept: INTERNAL MEDICINE CLINIC | Facility: CLINIC | Age: 59
End: 2019-11-11
Payer: COMMERCIAL

## 2019-11-11 VITALS
WEIGHT: 180.25 LBS | BODY MASS INDEX: 24.41 KG/M2 | HEIGHT: 72 IN | SYSTOLIC BLOOD PRESSURE: 111 MMHG | HEART RATE: 83 BPM | TEMPERATURE: 98 F | RESPIRATION RATE: 17 BRPM | DIASTOLIC BLOOD PRESSURE: 64 MMHG

## 2019-11-11 DIAGNOSIS — E78.00 HYPERCHOLESTEROLEMIA: ICD-10-CM

## 2019-11-11 DIAGNOSIS — I10 ESSENTIAL HYPERTENSION: ICD-10-CM

## 2019-11-11 DIAGNOSIS — Z12.11 COLON CANCER SCREENING: ICD-10-CM

## 2019-11-11 DIAGNOSIS — R42 DIZZINESS: ICD-10-CM

## 2019-11-11 DIAGNOSIS — M47.812 OSTEOARTHRITIS OF CERVICAL SPINE, UNSPECIFIED SPINAL OSTEOARTHRITIS COMPLICATION STATUS: ICD-10-CM

## 2019-11-11 DIAGNOSIS — Z86.73 HISTORY OF CVA (CEREBROVASCULAR ACCIDENT): ICD-10-CM

## 2019-11-11 DIAGNOSIS — Z00.00 PHYSICAL EXAM: Primary | ICD-10-CM

## 2019-11-11 DIAGNOSIS — F17.200 SMOKER: ICD-10-CM

## 2019-11-11 PROCEDURE — 99396 PREV VISIT EST AGE 40-64: CPT | Performed by: INTERNAL MEDICINE

## 2019-11-11 NOTE — PROGRESS NOTES
Nadege Valadez is a 60year old male. Patient presents with: Follow - Up: Follow up from last week on  lab results.        HPI:   Patient comes for follow-up  C/C physical   C/o blood pressure check  C/o of some dizziness when he tilts his head back–e status: Current Every Day Smoker        Packs/day: 0.50        Types: Cigarettes      Smokeless tobacco: Never Used    Alcohol use: Yes      Alcohol/week: 2.0 standard drinks      Types: 2 Cans of beer per week      Comment: twice weekly    Drug use:  No unspecified spinal osteoarthritis complication status  Noted on MRI 2012–stable  Dizziness  -     US CAROTID DOPPLER BILAT - DIAG IMG (CPT=93880);  Future  May be from arthritis will check ultrasound and monitor  Smoker  advised to quit         Preventative

## 2019-11-25 ENCOUNTER — HOSPITAL ENCOUNTER (OUTPATIENT)
Dept: ULTRASOUND IMAGING | Facility: HOSPITAL | Age: 59
Discharge: HOME | End: 2019-11-25
Attending: INTERNAL MEDICINE
Payer: COMMERCIAL

## 2019-11-25 DIAGNOSIS — Z86.73 HISTORY OF CVA (CEREBROVASCULAR ACCIDENT): ICD-10-CM

## 2019-11-25 DIAGNOSIS — R42 DIZZINESS: ICD-10-CM

## 2019-11-25 PROCEDURE — 93880 EXTRACRANIAL BILAT STUDY: CPT | Performed by: INTERNAL MEDICINE

## 2019-11-29 DIAGNOSIS — I65.21 INTERNAL CAROTID ARTERY STENOSIS, RIGHT: Primary | ICD-10-CM

## 2019-12-02 ENCOUNTER — TELEPHONE (OUTPATIENT)
Dept: INTERNAL MEDICINE CLINIC | Facility: CLINIC | Age: 59
End: 2019-12-02

## 2019-12-02 ENCOUNTER — TELEPHONE (OUTPATIENT)
Dept: OTHER | Age: 59
End: 2019-12-02

## 2019-12-02 NOTE — TELEPHONE ENCOUNTER
LM on , Dr Santos Mckeon, [address] [facility] [address] [address]   ANGUSJERILYN'A IOHYG [id number] is PPO, does not require referral, advised patient to contact customer service and verify that the doctor is in network

## 2019-12-02 NOTE — TELEPHONE ENCOUNTER
See closed encounter 12/2/19 reason for call Follow Up    LM on , Dr Max Tejada, [address] [facility] [address] [address]   QGCIRSW'Q DEJQK HKTIZXEAV is PPO, does not require referral, advised patient to contact customer service and

## 2019-12-02 NOTE — TELEPHONE ENCOUNTER
Managed Care, Patient informed of referral, he has changed insurance from [place] to [place], [employer], do you need to process a referral

## 2019-12-02 NOTE — PROGRESS NOTES
Spoke with patient (identified name and ), results reviewed and agrees with plan.   Patient has changed insurance, transferred to [facility] to update insurance and send referral to Dr Sandro Payne for approval

## 2019-12-02 NOTE — TELEPHONE ENCOUNTER
Concerned he missed a call   Looking for referral for specialist, someone was going to call and set up his appointment for him  Chi Thomason he will be running errands. If he does not answer please leave a message.

## 2019-12-30 ENCOUNTER — HOSPITAL ENCOUNTER (OUTPATIENT)
Dept: CT IMAGING | Facility: HOSPITAL | Age: 59
Discharge: HOME | End: 2019-12-30
Attending: PHYSICIAN ASSISTANT
Payer: COMMERCIAL

## 2019-12-30 DIAGNOSIS — I65.23 OCCLUSION AND STENOSIS OF BILATERAL CAROTID ARTERIES: ICD-10-CM

## 2019-12-30 LAB — CREAT BLD-MCNC: 0.9 MG/DL (ref 0.7–1.3)

## 2019-12-30 PROCEDURE — 70498 CT ANGIOGRAPHY NECK: CPT | Performed by: PHYSICIAN ASSISTANT

## 2019-12-30 PROCEDURE — 82565 ASSAY OF CREATININE: CPT

## 2020-01-01 ENCOUNTER — EXTERNAL RECORD (OUTPATIENT)
Dept: HEALTH INFORMATION MANAGEMENT | Facility: OTHER | Age: 60
End: 2020-01-01

## 2020-01-07 NOTE — PROGRESS NOTES
CTA reveals severe stenosis of the right internal carotid artery origin amenable to endarterectomy.   Patient was notified of the result and we are moving up his stress test for cardiac clearance so as to expedite getting him into surgery as early as next w

## 2020-01-08 ENCOUNTER — HOSPITAL ENCOUNTER (OUTPATIENT)
Dept: GENERAL RADIOLOGY | Facility: HOSPITAL | Age: 60
Discharge: HOME | End: 2020-01-08
Attending: THORACIC SURGERY (CARDIOTHORACIC VASCULAR SURGERY)
Payer: COMMERCIAL

## 2020-01-08 ENCOUNTER — HOSPITAL ENCOUNTER (OUTPATIENT)
Dept: NUCLEAR MEDICINE | Facility: HOSPITAL | Age: 60
Discharge: HOME | End: 2020-01-08
Attending: PHYSICIAN ASSISTANT
Payer: COMMERCIAL

## 2020-01-08 ENCOUNTER — LAB ENCOUNTER (OUTPATIENT)
Dept: LAB | Facility: HOSPITAL | Age: 60
End: 2020-01-08
Attending: PHYSICIAN ASSISTANT
Payer: COMMERCIAL

## 2020-01-08 ENCOUNTER — HOSPITAL ENCOUNTER (OUTPATIENT)
Dept: CV DIAGNOSTICS | Facility: HOSPITAL | Age: 60
Discharge: HOME | End: 2020-01-08
Attending: PHYSICIAN ASSISTANT
Payer: COMMERCIAL

## 2020-01-08 DIAGNOSIS — I20.0 UNSTABLE ANGINA PECTORIS (HCC): ICD-10-CM

## 2020-01-08 DIAGNOSIS — Z01.818 PRE-OPERATIVE CLEARANCE: ICD-10-CM

## 2020-01-08 DIAGNOSIS — Z01.818 PRE-OP TESTING: ICD-10-CM

## 2020-01-08 LAB
ABSOLUTE IMMATURE GRANULOCYTES (OFFPRE24): NORMAL
ALBUMIN SERPL-MCNC: 4.5 G/DL
ALBUMIN SERPL-MCNC: 4.5 G/DL (ref 3.4–5)
ALBUMIN/GLOB SERPL: 1.3 {RATIO}
ALBUMIN/GLOB SERPL: 1.3 {RATIO} (ref 1–2)
ALP LIVER SERPL-CCNC: 87 U/L (ref 45–117)
ALP SERPL-CCNC: 87 U/L
ALT SERPL-CCNC: 22 U/L
ALT SERPL-CCNC: 22 U/L (ref 16–61)
ANION GAP SERPL CALC-SCNC: 3 MMOL/L (ref 0–18)
ANION GAP SERPL CALC-SCNC: NORMAL MMOL/L
ANTIBODY SCREEN: NEGATIVE
AST SERPL-CCNC: 21 U/L
AST SERPL-CCNC: 21 U/L (ref 15–37)
BASO+EOS+MONOS # BLD: NORMAL 10*3/UL
BASO+EOS+MONOS NFR BLD: NORMAL %
BASOPHILS # BLD AUTO: 0.03 X10(3) UL (ref 0–0.2)
BASOPHILS # BLD: NORMAL 10*3/UL
BASOPHILS NFR BLD AUTO: 0.4 %
BASOPHILS NFR BLD: NORMAL %
BILIRUB SERPL-MCNC: 0.5 MG/DL
BILIRUB SERPL-MCNC: 0.5 MG/DL (ref 0.1–2)
BILIRUB UR QL: NEGATIVE
BUN BLD-MCNC: 23 MG/DL (ref 7–18)
BUN SERPL-MCNC: 23 MG/DL
BUN/CREAT SERPL: 24.2 (ref 10–20)
BUN/CREAT SERPL: NORMAL
CALCIUM BLD-MCNC: 9.1 MG/DL (ref 8.5–10.1)
CALCIUM SERPL-MCNC: 9.1 MG/DL
CHLORIDE SERPL-SCNC: 105 MMOL/L
CHLORIDE SERPL-SCNC: 105 MMOL/L (ref 98–112)
CLARITY UR: CLEAR
CO2 SERPL-SCNC: 29 MMOL/L (ref 21–32)
CO2 SERPL-SCNC: NORMAL MMOL/L
COLOR UR: YELLOW
CREAT BLD-MCNC: 0.95 MG/DL (ref 0.7–1.3)
CREAT SERPL-MCNC: 0.95 MG/DL
DEPRECATED RDW RBC AUTO: 41.4 FL (ref 35.1–46.3)
DIFFERENTIAL METHOD BLD: NORMAL
EOSINOPHIL # BLD AUTO: 0.17 X10(3) UL (ref 0–0.7)
EOSINOPHIL # BLD: NORMAL 10*3/UL
EOSINOPHIL NFR BLD AUTO: 2.4 %
EOSINOPHIL NFR BLD: NORMAL %
ERYTHROCYTE [DISTWIDTH] IN BLOOD BY AUTOMATED COUNT: 12.7 % (ref 11–15)
ERYTHROCYTE [DISTWIDTH] IN BLOOD: NORMAL %
GLOBULIN PLAS-MCNC: 3.4 G/DL (ref 2.8–4.4)
GLOBULIN SER-MCNC: 3.4 G/DL
GLUCOSE BLD-MCNC: 91 MG/DL (ref 70–99)
GLUCOSE SERPL-MCNC: 91 MG/DL
GLUCOSE UR-MCNC: NEGATIVE MG/DL
HCT VFR BLD AUTO: 44.6 % (ref 39–53)
HCT VFR BLD CALC: 44.6 %
HGB BLD-MCNC: 14.5 G/DL
HGB BLD-MCNC: 14.5 G/DL (ref 13–17.5)
HGB UR QL STRIP.AUTO: NEGATIVE
IMM GRANULOCYTES # BLD AUTO: 0.02 X10(3) UL (ref 0–1)
IMM GRANULOCYTES NFR BLD: 0.3 %
IMMATURE GRANULOCYTES (OFFPRE25): NORMAL
KETONES UR-MCNC: NEGATIVE MG/DL
LENGTH OF FAST TIME PATIENT: NORMAL H
LEUKOCYTE ESTERASE UR QL STRIP.AUTO: NEGATIVE
LYMPHOCYTES # BLD AUTO: 2.1 X10(3) UL (ref 1–4)
LYMPHOCYTES # BLD: NORMAL 10*3/UL
LYMPHOCYTES NFR BLD AUTO: 30 %
LYMPHOCYTES NFR BLD: NORMAL %
M PROTEIN MFR SERPL ELPH: 7.9 G/DL (ref 6.4–8.2)
MCH RBC QN AUTO: 29.2 PG (ref 26–34)
MCH RBC QN AUTO: NORMAL PG
MCHC RBC AUTO-ENTMCNC: 32.5 G/DL (ref 31–37)
MCHC RBC AUTO-ENTMCNC: NORMAL G/DL
MCV RBC AUTO: 89.7 FL (ref 80–100)
MCV RBC AUTO: NORMAL FL
MONOCYTES # BLD AUTO: 0.41 X10(3) UL (ref 0.1–1)
MONOCYTES # BLD: NORMAL 10*3/UL
MONOCYTES NFR BLD AUTO: 5.8 %
MONOCYTES NFR BLD: NORMAL %
MPV (OFFPRE2): NORMAL
NEUTROPHILS # BLD AUTO: 4.28 X10 (3) UL (ref 1.5–7.7)
NEUTROPHILS # BLD AUTO: 4.28 X10(3) UL (ref 1.5–7.7)
NEUTROPHILS # BLD: NORMAL 10*3/UL
NEUTROPHILS NFR BLD AUTO: 61.1 %
NEUTROPHILS NFR BLD: NORMAL %
NITRITE UR QL STRIP.AUTO: NEGATIVE
NRBC BLD MANUAL-RTO: NORMAL %
OSMOLALITY SERPL CALC.SUM OF ELEC: 287 MOSM/KG (ref 275–295)
PH UR: 6 [PH] (ref 5–8)
PLAT MORPH BLD: NORMAL
PLATELET # BLD AUTO: 309 10(3)UL (ref 150–450)
PLATELET # BLD: 309 10*3/UL
POTASSIUM SERPL-SCNC: 3.9 MMOL/L
POTASSIUM SERPL-SCNC: 3.9 MMOL/L (ref 3.5–5.1)
PROT SERPL-MCNC: 7.9 G/DL
PROT UR-MCNC: NEGATIVE MG/DL
RBC # BLD AUTO: 4.97 X10(6)UL (ref 4.3–5.7)
RBC # BLD: 4.97 10*6/UL
RBC MORPH BLD: NORMAL
RH BLOOD TYPE: POSITIVE
SODIUM SERPL-SCNC: 137 MMOL/L
SODIUM SERPL-SCNC: 137 MMOL/L (ref 136–145)
SP GR UR STRIP: 1.02 (ref 1–1.03)
UROBILINOGEN UR STRIP-ACNC: <2
WBC # BLD AUTO: 7 X10(3) UL (ref 4–11)
WBC # BLD: 7 10*3/UL
WBC MORPH BLD: NORMAL

## 2020-01-08 PROCEDURE — 86901 BLOOD TYPING SEROLOGIC RH(D): CPT

## 2020-01-08 PROCEDURE — 86850 RBC ANTIBODY SCREEN: CPT

## 2020-01-08 PROCEDURE — 86900 BLOOD TYPING SEROLOGIC ABO: CPT

## 2020-01-08 PROCEDURE — 80053 COMPREHEN METABOLIC PANEL: CPT

## 2020-01-08 PROCEDURE — 36415 COLL VENOUS BLD VENIPUNCTURE: CPT

## 2020-01-08 PROCEDURE — 71046 X-RAY EXAM CHEST 2 VIEWS: CPT | Performed by: THORACIC SURGERY (CARDIOTHORACIC VASCULAR SURGERY)

## 2020-01-08 PROCEDURE — 87641 MR-STAPH DNA AMP PROBE: CPT

## 2020-01-08 PROCEDURE — 85025 COMPLETE CBC W/AUTO DIFF WBC: CPT

## 2020-01-08 PROCEDURE — 81003 URINALYSIS AUTO W/O SCOPE: CPT

## 2020-01-08 PROCEDURE — 93018 CV STRESS TEST I&R ONLY: CPT | Performed by: PHYSICIAN ASSISTANT

## 2020-01-08 PROCEDURE — 78452 HT MUSCLE IMAGE SPECT MULT: CPT | Performed by: PHYSICIAN ASSISTANT

## 2020-01-08 PROCEDURE — 93017 CV STRESS TEST TRACING ONLY: CPT | Performed by: PHYSICIAN ASSISTANT

## 2020-01-08 PROCEDURE — 93016 CV STRESS TEST SUPVJ ONLY: CPT | Performed by: PHYSICIAN ASSISTANT

## 2020-01-09 ENCOUNTER — OFFICE VISIT (OUTPATIENT)
Dept: CARDIOLOGY | Age: 60
End: 2020-01-09

## 2020-01-09 ENCOUNTER — TELEPHONE (OUTPATIENT)
Dept: CARDIAC SURGERY | Facility: HOSPITAL | Age: 60
End: 2020-01-09

## 2020-01-09 VITALS
DIASTOLIC BLOOD PRESSURE: 90 MMHG | BODY MASS INDEX: 33.49 KG/M2 | WEIGHT: 182 LBS | HEIGHT: 62 IN | HEART RATE: 72 BPM | SYSTOLIC BLOOD PRESSURE: 164 MMHG

## 2020-01-09 DIAGNOSIS — Z01.818 PRE-OP EVALUATION: ICD-10-CM

## 2020-01-09 DIAGNOSIS — I65.23 CAROTID STENOSIS, BILATERAL: Primary | ICD-10-CM

## 2020-01-09 LAB — MRSA DNA SPEC QL NAA+PROBE: NEGATIVE

## 2020-01-09 PROCEDURE — 99204 OFFICE O/P NEW MOD 45 MIN: CPT | Performed by: INTERNAL MEDICINE

## 2020-01-09 RX ORDER — LISINOPRIL AND HYDROCHLOROTHIAZIDE 25; 20 MG/1; MG/1
1 TABLET ORAL DAILY
COMMUNITY

## 2020-01-09 RX ORDER — CLOPIDOGREL BISULFATE 75 MG/1
75 TABLET ORAL DAILY
COMMUNITY

## 2020-01-09 RX ORDER — SIMVASTATIN 10 MG
10 TABLET ORAL NIGHTLY
COMMUNITY

## 2020-01-09 SDOH — HEALTH STABILITY: PHYSICAL HEALTH: ON AVERAGE, HOW MANY MINUTES DO YOU ENGAGE IN EXERCISE AT THIS LEVEL?: PATIENT DECLINED

## 2020-01-09 SDOH — HEALTH STABILITY: PHYSICAL HEALTH
ON AVERAGE, HOW MANY DAYS PER WEEK DO YOU ENGAGE IN MODERATE TO STRENUOUS EXERCISE (LIKE A BRISK WALK)?: PATIENT DECLINED

## 2020-01-09 ASSESSMENT — ENCOUNTER SYMPTOMS
ENDOCRINE NEGATIVE: 1
HEMOPTYSIS: 0
DIZZINESS: 0
ABDOMINAL PAIN: 0
CHILLS: 0
LIGHT-HEADEDNESS: 0
SUSPICIOUS LESIONS: 0
WEIGHT LOSS: 0
SYNCOPE: 0
FEVER: 0
COUGH: 0
BRUISES/BLEEDS EASILY: 0
PSYCHIATRIC NEGATIVE: 1
WEIGHT GAIN: 0
HEMATOCHEZIA: 0
SHORTNESS OF BREATH: 0

## 2020-01-09 ASSESSMENT — PATIENT HEALTH QUESTIONNAIRE - PHQ9
1. LITTLE INTEREST OR PLEASURE IN DOING THINGS: NOT AT ALL
SUM OF ALL RESPONSES TO PHQ9 QUESTIONS 1 AND 2: 0
2. FEELING DOWN, DEPRESSED OR HOPELESS: NOT AT ALL
SUM OF ALL RESPONSES TO PHQ9 QUESTIONS 1 AND 2: 0

## 2020-01-09 NOTE — PROGRESS NOTES
Patient stress test was abnormal with a fixed perfusion defect inferior wall. He will need clearance from cardiology prior to carotid endarterectomy. We will try to expedite.

## 2020-01-09 NOTE — TELEPHONE ENCOUNTER
Patient is scheduled for carotid endarterectomy Monday 1/13/20 with Dr Jody Matias.  Pre op thallium stress test was abnormal. Dr Jody Matias requesting cardiac clearance pre-op from cardiologist.   Spoke with patient who stated he did not have a cardiologist.  Ca

## 2020-01-13 ENCOUNTER — ANESTHESIA (OUTPATIENT)
Dept: SURGERY | Facility: HOSPITAL | Age: 60
DRG: 038 | End: 2020-01-13
Payer: COMMERCIAL

## 2020-01-13 ENCOUNTER — CLINICAL ABSTRACT (OUTPATIENT)
Dept: CARDIOLOGY | Age: 60
End: 2020-01-13

## 2020-01-13 ENCOUNTER — HOSPITAL ENCOUNTER (INPATIENT)
Facility: HOSPITAL | Age: 60
LOS: 1 days | Discharge: HOME | DRG: 038 | End: 2020-01-14
Attending: THORACIC SURGERY (CARDIOTHORACIC VASCULAR SURGERY) | Admitting: THORACIC SURGERY (CARDIOTHORACIC VASCULAR SURGERY)
Payer: COMMERCIAL

## 2020-01-13 ENCOUNTER — ANESTHESIA EVENT (OUTPATIENT)
Dept: SURGERY | Facility: HOSPITAL | Age: 60
DRG: 038 | End: 2020-01-13
Payer: COMMERCIAL

## 2020-01-13 DIAGNOSIS — Z01.818 PRE-OP TESTING: Primary | ICD-10-CM

## 2020-01-13 DIAGNOSIS — I65.23 CAROTID STENOSIS, BILATERAL: ICD-10-CM

## 2020-01-13 PROBLEM — I65.21 STENOSIS OF RIGHT CAROTID ARTERY: Status: ACTIVE | Noted: 2020-01-13

## 2020-01-13 LAB
APTT PPP: 32.3 SECONDS (ref 23.2–35.3)
DEPRECATED RDW RBC AUTO: 41.3 FL (ref 35.1–46.3)
ERYTHROCYTE [DISTWIDTH] IN BLOOD BY AUTOMATED COUNT: 12.7 % (ref 11–15)
HCT VFR BLD AUTO: 37.3 % (ref 39–53)
HGB BLD-MCNC: 12.7 G/DL (ref 13–17.5)
INR BLD: 1.24 (ref 0.9–1.2)
MCH RBC QN AUTO: 30.3 PG (ref 26–34)
MCHC RBC AUTO-ENTMCNC: 34 G/DL (ref 31–37)
MCV RBC AUTO: 89 FL (ref 80–100)
PLATELET # BLD AUTO: 248 10(3)UL (ref 150–450)
PROTHROMBIN TIME: 15.5 SECONDS (ref 11.8–14.5)
RBC # BLD AUTO: 4.19 X10(6)UL (ref 4.3–5.7)
WBC # BLD AUTO: 7.5 X10(3) UL (ref 4–11)

## 2020-01-13 PROCEDURE — 03UK0JZ SUPPLEMENT RIGHT INTERNAL CAROTID ARTERY WITH SYNTHETIC SUBSTITUTE, OPEN APPROACH: ICD-10-PCS | Performed by: THORACIC SURGERY (CARDIOTHORACIC VASCULAR SURGERY)

## 2020-01-13 PROCEDURE — 99232 SBSQ HOSP IP/OBS MODERATE 35: CPT | Performed by: HOSPITALIST

## 2020-01-13 PROCEDURE — 4A030B1 MEASUREMENT OF ARTERIAL PRESSURE, PERIPHERAL, OPEN APPROACH: ICD-10-PCS | Performed by: THORACIC SURGERY (CARDIOTHORACIC VASCULAR SURGERY)

## 2020-01-13 PROCEDURE — 03CK0ZZ EXTIRPATION OF MATTER FROM RIGHT INTERNAL CAROTID ARTERY, OPEN APPROACH: ICD-10-PCS | Performed by: THORACIC SURGERY (CARDIOTHORACIC VASCULAR SURGERY)

## 2020-01-13 DEVICE — PATCH CV 8X.8CM VSGRD GLBL BVN: Type: IMPLANTABLE DEVICE | Status: FUNCTIONAL

## 2020-01-13 RX ORDER — HYDROMORPHONE HYDROCHLORIDE 1 MG/ML
0.4 INJECTION, SOLUTION INTRAMUSCULAR; INTRAVENOUS; SUBCUTANEOUS EVERY 5 MIN PRN
Status: DISCONTINUED | OUTPATIENT
Start: 2020-01-13 | End: 2020-01-13 | Stop reason: HOSPADM

## 2020-01-13 RX ORDER — METOPROLOL TARTRATE 5 MG/5ML
INJECTION INTRAVENOUS AS NEEDED
Status: DISCONTINUED | OUTPATIENT
Start: 2020-01-13 | End: 2020-01-13 | Stop reason: SURG

## 2020-01-13 RX ORDER — NEOSTIGMINE METHYLSULFATE 0.5 MG/ML
INJECTION INTRAVENOUS AS NEEDED
Status: DISCONTINUED | OUTPATIENT
Start: 2020-01-13 | End: 2020-01-13 | Stop reason: SURG

## 2020-01-13 RX ORDER — HYDROCODONE BITARTRATE AND ACETAMINOPHEN 5; 325 MG/1; MG/1
1 TABLET ORAL AS NEEDED
Status: DISCONTINUED | OUTPATIENT
Start: 2020-01-13 | End: 2020-01-13 | Stop reason: HOSPADM

## 2020-01-13 RX ORDER — BUPIVACAINE HYDROCHLORIDE AND EPINEPHRINE 2.5; 5 MG/ML; UG/ML
INJECTION, SOLUTION INFILTRATION; PERINEURAL AS NEEDED
Status: DISCONTINUED | OUTPATIENT
Start: 2020-01-13 | End: 2020-01-13 | Stop reason: HOSPADM

## 2020-01-13 RX ORDER — ATORVASTATIN CALCIUM 10 MG/1
10 TABLET, FILM COATED ORAL NIGHTLY
Status: DISCONTINUED | OUTPATIENT
Start: 2020-01-14 | End: 2020-01-13 | Stop reason: CLARIF

## 2020-01-13 RX ORDER — HYDROMORPHONE HYDROCHLORIDE 1 MG/ML
0.6 INJECTION, SOLUTION INTRAMUSCULAR; INTRAVENOUS; SUBCUTANEOUS EVERY 5 MIN PRN
Status: DISCONTINUED | OUTPATIENT
Start: 2020-01-13 | End: 2020-01-13 | Stop reason: HOSPADM

## 2020-01-13 RX ORDER — SODIUM PHOSPHATE, DIBASIC AND SODIUM PHOSPHATE, MONOBASIC 7; 19 G/133ML; G/133ML
1 ENEMA RECTAL ONCE AS NEEDED
Status: DISCONTINUED | OUTPATIENT
Start: 2020-01-13 | End: 2020-01-14

## 2020-01-13 RX ORDER — POLYETHYLENE GLYCOL 3350 17 G/17G
17 POWDER, FOR SOLUTION ORAL DAILY PRN
Status: DISCONTINUED | OUTPATIENT
Start: 2020-01-13 | End: 2020-01-14

## 2020-01-13 RX ORDER — MIDAZOLAM HYDROCHLORIDE 1 MG/ML
INJECTION INTRAMUSCULAR; INTRAVENOUS AS NEEDED
Status: DISCONTINUED | OUTPATIENT
Start: 2020-01-13 | End: 2020-01-13 | Stop reason: SURG

## 2020-01-13 RX ORDER — ACETAMINOPHEN 325 MG/1
650 TABLET ORAL EVERY 4 HOURS PRN
Status: DISCONTINUED | OUTPATIENT
Start: 2020-01-13 | End: 2020-01-14

## 2020-01-13 RX ORDER — ONDANSETRON 2 MG/ML
4 INJECTION INTRAMUSCULAR; INTRAVENOUS ONCE AS NEEDED
Status: DISCONTINUED | OUTPATIENT
Start: 2020-01-13 | End: 2020-01-13 | Stop reason: HOSPADM

## 2020-01-13 RX ORDER — DEXAMETHASONE SODIUM PHOSPHATE 4 MG/ML
VIAL (ML) INJECTION AS NEEDED
Status: DISCONTINUED | OUTPATIENT
Start: 2020-01-13 | End: 2020-01-13 | Stop reason: SURG

## 2020-01-13 RX ORDER — ONDANSETRON 2 MG/ML
INJECTION INTRAMUSCULAR; INTRAVENOUS AS NEEDED
Status: DISCONTINUED | OUTPATIENT
Start: 2020-01-13 | End: 2020-01-13 | Stop reason: SURG

## 2020-01-13 RX ORDER — HYDROMORPHONE HYDROCHLORIDE 1 MG/ML
0.2 INJECTION, SOLUTION INTRAMUSCULAR; INTRAVENOUS; SUBCUTANEOUS EVERY 5 MIN PRN
Status: DISCONTINUED | OUTPATIENT
Start: 2020-01-13 | End: 2020-01-13 | Stop reason: HOSPADM

## 2020-01-13 RX ORDER — FAMOTIDINE 20 MG/1
20 TABLET ORAL ONCE
Status: DISCONTINUED | OUTPATIENT
Start: 2020-01-13 | End: 2020-01-13 | Stop reason: HOSPADM

## 2020-01-13 RX ORDER — CEFAZOLIN SODIUM/WATER 2 G/20 ML
2 SYRINGE (ML) INTRAVENOUS ONCE
Status: COMPLETED | OUTPATIENT
Start: 2020-01-13 | End: 2020-01-13

## 2020-01-13 RX ORDER — ONDANSETRON 2 MG/ML
4 INJECTION INTRAMUSCULAR; INTRAVENOUS EVERY 6 HOURS PRN
Status: DISCONTINUED | OUTPATIENT
Start: 2020-01-13 | End: 2020-01-14

## 2020-01-13 RX ORDER — METOCLOPRAMIDE 10 MG/1
10 TABLET ORAL ONCE
Status: DISCONTINUED | OUTPATIENT
Start: 2020-01-13 | End: 2020-01-13 | Stop reason: HOSPADM

## 2020-01-13 RX ORDER — EPHEDRINE SULFATE 50 MG/ML
INJECTION, SOLUTION INTRAVENOUS AS NEEDED
Status: DISCONTINUED | OUTPATIENT
Start: 2020-01-13 | End: 2020-01-13 | Stop reason: SURG

## 2020-01-13 RX ORDER — MORPHINE SULFATE 2 MG/ML
1 INJECTION, SOLUTION INTRAMUSCULAR; INTRAVENOUS EVERY 2 HOUR PRN
Status: DISCONTINUED | OUTPATIENT
Start: 2020-01-13 | End: 2020-01-14

## 2020-01-13 RX ORDER — NALOXONE HYDROCHLORIDE 0.4 MG/ML
80 INJECTION, SOLUTION INTRAMUSCULAR; INTRAVENOUS; SUBCUTANEOUS AS NEEDED
Status: DISCONTINUED | OUTPATIENT
Start: 2020-01-13 | End: 2020-01-13 | Stop reason: HOSPADM

## 2020-01-13 RX ORDER — MORPHINE SULFATE 4 MG/ML
4 INJECTION, SOLUTION INTRAMUSCULAR; INTRAVENOUS EVERY 10 MIN PRN
Status: DISCONTINUED | OUTPATIENT
Start: 2020-01-13 | End: 2020-01-13 | Stop reason: HOSPADM

## 2020-01-13 RX ORDER — ACETAMINOPHEN 500 MG
1000 TABLET ORAL ONCE
Status: COMPLETED | OUTPATIENT
Start: 2020-01-13 | End: 2020-01-13

## 2020-01-13 RX ORDER — MORPHINE SULFATE 2 MG/ML
2 INJECTION, SOLUTION INTRAMUSCULAR; INTRAVENOUS EVERY 2 HOUR PRN
Status: DISCONTINUED | OUTPATIENT
Start: 2020-01-13 | End: 2020-01-14

## 2020-01-13 RX ORDER — SODIUM CHLORIDE, SODIUM LACTATE, POTASSIUM CHLORIDE, CALCIUM CHLORIDE 600; 310; 30; 20 MG/100ML; MG/100ML; MG/100ML; MG/100ML
INJECTION, SOLUTION INTRAVENOUS CONTINUOUS
Status: DISCONTINUED | OUTPATIENT
Start: 2020-01-13 | End: 2020-01-14

## 2020-01-13 RX ORDER — DEXTROSE AND SODIUM CHLORIDE 5; .45 G/100ML; G/100ML
INJECTION, SOLUTION INTRAVENOUS CONTINUOUS
Status: DISCONTINUED | OUTPATIENT
Start: 2020-01-13 | End: 2020-01-14

## 2020-01-13 RX ORDER — ASPIRIN 81 MG/1
81 TABLET ORAL DAILY
Status: DISCONTINUED | OUTPATIENT
Start: 2020-01-14 | End: 2020-01-14

## 2020-01-13 RX ORDER — ENOXAPARIN SODIUM 100 MG/ML
40 INJECTION SUBCUTANEOUS DAILY
Status: DISCONTINUED | OUTPATIENT
Start: 2020-01-14 | End: 2020-01-14

## 2020-01-13 RX ORDER — LISINOPRIL AND HYDROCHLOROTHIAZIDE 25; 20 MG/1; MG/1
1 TABLET ORAL
Status: DISCONTINUED | OUTPATIENT
Start: 2020-01-14 | End: 2020-01-13

## 2020-01-13 RX ORDER — HYDROCODONE BITARTRATE AND ACETAMINOPHEN 5; 325 MG/1; MG/1
2 TABLET ORAL AS NEEDED
Status: DISCONTINUED | OUTPATIENT
Start: 2020-01-13 | End: 2020-01-13 | Stop reason: HOSPADM

## 2020-01-13 RX ORDER — ASPIRIN 300 MG
300 SUPPOSITORY, RECTAL RECTAL ONCE
Status: DISCONTINUED | OUTPATIENT
Start: 2020-01-13 | End: 2020-01-13

## 2020-01-13 RX ORDER — DEXTROSE MONOHYDRATE 25 G/50ML
50 INJECTION, SOLUTION INTRAVENOUS
Status: DISCONTINUED | OUTPATIENT
Start: 2020-01-13 | End: 2020-01-13 | Stop reason: HOSPADM

## 2020-01-13 RX ORDER — ASPIRIN 300 MG
SUPPOSITORY, RECTAL RECTAL AS NEEDED
Status: DISCONTINUED | OUTPATIENT
Start: 2020-01-13 | End: 2020-01-13 | Stop reason: HOSPADM

## 2020-01-13 RX ORDER — MORPHINE SULFATE 10 MG/ML
6 INJECTION, SOLUTION INTRAMUSCULAR; INTRAVENOUS EVERY 10 MIN PRN
Status: DISCONTINUED | OUTPATIENT
Start: 2020-01-13 | End: 2020-01-13 | Stop reason: HOSPADM

## 2020-01-13 RX ORDER — CEFAZOLIN SODIUM/WATER 2 G/20 ML
2 SYRINGE (ML) INTRAVENOUS EVERY 8 HOURS
Status: COMPLETED | OUTPATIENT
Start: 2020-01-13 | End: 2020-01-14

## 2020-01-13 RX ORDER — GLYCOPYRROLATE 0.2 MG/ML
INJECTION INTRAMUSCULAR; INTRAVENOUS AS NEEDED
Status: DISCONTINUED | OUTPATIENT
Start: 2020-01-13 | End: 2020-01-13 | Stop reason: SURG

## 2020-01-13 RX ORDER — LIDOCAINE HYDROCHLORIDE 10 MG/ML
INJECTION, SOLUTION EPIDURAL; INFILTRATION; INTRACAUDAL; PERINEURAL AS NEEDED
Status: DISCONTINUED | OUTPATIENT
Start: 2020-01-13 | End: 2020-01-13 | Stop reason: SURG

## 2020-01-13 RX ORDER — HYDROCODONE BITARTRATE AND ACETAMINOPHEN 5; 325 MG/1; MG/1
2 TABLET ORAL EVERY 4 HOURS PRN
Status: DISCONTINUED | OUTPATIENT
Start: 2020-01-13 | End: 2020-01-14

## 2020-01-13 RX ORDER — NITROGLYCERIN 20 MG/100ML
INJECTION INTRAVENOUS CONTINUOUS PRN
Status: DISCONTINUED | OUTPATIENT
Start: 2020-01-13 | End: 2020-01-14

## 2020-01-13 RX ORDER — PHENYLEPHRINE HCL 10 MG/ML
VIAL (ML) INJECTION AS NEEDED
Status: DISCONTINUED | OUTPATIENT
Start: 2020-01-13 | End: 2020-01-13 | Stop reason: SURG

## 2020-01-13 RX ORDER — HEPARIN SODIUM 5000 [USP'U]/ML
INJECTION, SOLUTION INTRAVENOUS; SUBCUTANEOUS AS NEEDED
Status: DISCONTINUED | OUTPATIENT
Start: 2020-01-13 | End: 2020-01-13 | Stop reason: SURG

## 2020-01-13 RX ORDER — SODIUM CHLORIDE, SODIUM LACTATE, POTASSIUM CHLORIDE, CALCIUM CHLORIDE 600; 310; 30; 20 MG/100ML; MG/100ML; MG/100ML; MG/100ML
INJECTION, SOLUTION INTRAVENOUS CONTINUOUS
Status: DISCONTINUED | OUTPATIENT
Start: 2020-01-13 | End: 2020-01-13 | Stop reason: HOSPADM

## 2020-01-13 RX ORDER — ROCURONIUM BROMIDE 10 MG/ML
INJECTION, SOLUTION INTRAVENOUS AS NEEDED
Status: DISCONTINUED | OUTPATIENT
Start: 2020-01-13 | End: 2020-01-13 | Stop reason: SURG

## 2020-01-13 RX ORDER — LIDOCAINE HYDROCHLORIDE 10 MG/ML
INJECTION, SOLUTION INFILTRATION; PERINEURAL
Status: COMPLETED | OUTPATIENT
Start: 2020-01-13 | End: 2020-01-13

## 2020-01-13 RX ORDER — CLOPIDOGREL BISULFATE 75 MG/1
75 TABLET ORAL DAILY
Status: DISCONTINUED | OUTPATIENT
Start: 2020-01-14 | End: 2020-01-14

## 2020-01-13 RX ORDER — BISACODYL 10 MG
10 SUPPOSITORY, RECTAL RECTAL
Status: DISCONTINUED | OUTPATIENT
Start: 2020-01-13 | End: 2020-01-14

## 2020-01-13 RX ORDER — PRAVASTATIN SODIUM 20 MG
20 TABLET ORAL NIGHTLY
Status: DISCONTINUED | OUTPATIENT
Start: 2020-01-13 | End: 2020-01-14

## 2020-01-13 RX ORDER — DOCUSATE SODIUM 100 MG/1
100 CAPSULE, LIQUID FILLED ORAL 2 TIMES DAILY
Status: DISCONTINUED | OUTPATIENT
Start: 2020-01-13 | End: 2020-01-14

## 2020-01-13 RX ORDER — PROCHLORPERAZINE EDISYLATE 5 MG/ML
5 INJECTION INTRAMUSCULAR; INTRAVENOUS ONCE AS NEEDED
Status: DISCONTINUED | OUTPATIENT
Start: 2020-01-13 | End: 2020-01-13 | Stop reason: HOSPADM

## 2020-01-13 RX ORDER — MORPHINE SULFATE 4 MG/ML
2 INJECTION, SOLUTION INTRAMUSCULAR; INTRAVENOUS EVERY 10 MIN PRN
Status: DISCONTINUED | OUTPATIENT
Start: 2020-01-13 | End: 2020-01-13 | Stop reason: HOSPADM

## 2020-01-13 RX ORDER — HYDROCODONE BITARTRATE AND ACETAMINOPHEN 5; 325 MG/1; MG/1
1 TABLET ORAL EVERY 4 HOURS PRN
Status: DISCONTINUED | OUTPATIENT
Start: 2020-01-13 | End: 2020-01-14

## 2020-01-13 RX ORDER — METOPROLOL TARTRATE 5 MG/5ML
2.5 INJECTION INTRAVENOUS
Status: DISCONTINUED | OUTPATIENT
Start: 2020-01-13 | End: 2020-01-14

## 2020-01-13 RX ORDER — LIDOCAINE HYDROCHLORIDE 40 MG/ML
SOLUTION TOPICAL AS NEEDED
Status: DISCONTINUED | OUTPATIENT
Start: 2020-01-13 | End: 2020-01-13 | Stop reason: SURG

## 2020-01-13 RX ORDER — HALOPERIDOL 5 MG/ML
0.25 INJECTION INTRAMUSCULAR ONCE AS NEEDED
Status: DISCONTINUED | OUTPATIENT
Start: 2020-01-13 | End: 2020-01-13 | Stop reason: HOSPADM

## 2020-01-13 RX ADMIN — LIDOCAINE HYDROCHLORIDE 25 MG: 10 INJECTION, SOLUTION EPIDURAL; INFILTRATION; INTRACAUDAL; PERINEURAL at 07:31:00

## 2020-01-13 RX ADMIN — PHENYLEPHRINE HCL 100 MCG: 10 MG/ML VIAL (ML) INJECTION at 08:26:00

## 2020-01-13 RX ADMIN — PHENYLEPHRINE HCL 100 MCG: 10 MG/ML VIAL (ML) INJECTION at 08:50:00

## 2020-01-13 RX ADMIN — LIDOCAINE HYDROCHLORIDE 4 ML: 40 SOLUTION TOPICAL at 07:31:00

## 2020-01-13 RX ADMIN — HEPARIN SODIUM 8000 UNITS: 5000 INJECTION, SOLUTION INTRAVENOUS; SUBCUTANEOUS at 08:09:00

## 2020-01-13 RX ADMIN — CEFAZOLIN SODIUM/WATER 2 G: 2 G/20 ML SYRINGE (ML) INTRAVENOUS at 07:49:00

## 2020-01-13 RX ADMIN — EPHEDRINE SULFATE 10 MG: 50 INJECTION, SOLUTION INTRAVENOUS at 08:31:00

## 2020-01-13 RX ADMIN — ONDANSETRON 4 MG: 2 INJECTION INTRAMUSCULAR; INTRAVENOUS at 07:31:00

## 2020-01-13 RX ADMIN — SODIUM CHLORIDE, SODIUM LACTATE, POTASSIUM CHLORIDE, CALCIUM CHLORIDE: 600; 310; 30; 20 INJECTION, SOLUTION INTRAVENOUS at 07:31:00

## 2020-01-13 RX ADMIN — LIDOCAINE HYDROCHLORIDE 1 ML: 10 INJECTION, SOLUTION INFILTRATION; PERINEURAL at 07:30:00

## 2020-01-13 RX ADMIN — METOPROLOL TARTRATE 5 MG: 5 INJECTION INTRAVENOUS at 07:57:00

## 2020-01-13 RX ADMIN — ROCURONIUM BROMIDE 50 MG: 10 INJECTION, SOLUTION INTRAVENOUS at 07:31:00

## 2020-01-13 RX ADMIN — PHENYLEPHRINE HCL 100 MCG: 10 MG/ML VIAL (ML) INJECTION at 07:48:00

## 2020-01-13 RX ADMIN — GLYCOPYRROLATE 0.6 MG: 0.2 INJECTION INTRAMUSCULAR; INTRAVENOUS at 08:56:00

## 2020-01-13 RX ADMIN — PHENYLEPHRINE HCL 100 MCG: 10 MG/ML VIAL (ML) INJECTION at 07:40:00

## 2020-01-13 RX ADMIN — EPHEDRINE SULFATE 5 MG: 50 INJECTION, SOLUTION INTRAVENOUS at 07:52:00

## 2020-01-13 RX ADMIN — PHENYLEPHRINE HCL 100 MCG: 10 MG/ML VIAL (ML) INJECTION at 08:17:00

## 2020-01-13 RX ADMIN — MIDAZOLAM HYDROCHLORIDE 2 MG: 1 INJECTION INTRAMUSCULAR; INTRAVENOUS at 07:31:00

## 2020-01-13 RX ADMIN — DEXAMETHASONE SODIUM PHOSPHATE 4 MG: 4 MG/ML VIAL (ML) INJECTION at 07:31:00

## 2020-01-13 RX ADMIN — ROCURONIUM BROMIDE 20 MG: 10 INJECTION, SOLUTION INTRAVENOUS at 08:09:00

## 2020-01-13 RX ADMIN — PHENYLEPHRINE HCL 100 MCG: 10 MG/ML VIAL (ML) INJECTION at 08:13:00

## 2020-01-13 RX ADMIN — EPHEDRINE SULFATE 10 MG: 50 INJECTION, SOLUTION INTRAVENOUS at 08:13:00

## 2020-01-13 RX ADMIN — NEOSTIGMINE METHYLSULFATE 3 MG: 0.5 INJECTION INTRAVENOUS at 08:56:00

## 2020-01-13 NOTE — ANESTHESIA POSTPROCEDURE EVALUATION
Patient: Elmo Humphreys    Procedure Summary     Date:  01/13/20 Room / Location:  [address] [place] [address] MAIN OR 18 / [address] [place] [address] MAIN OR    Anesthesia Start:  4616 Anesthesia Stop:  5483    Procedure:  CAROTID ENDARTERECTOMY (Right ) Diagnosis:       Carotid stenosis, bilatera

## 2020-01-13 NOTE — H&P
Reason for Consultation:   Carotid Stenosis     History of Present Illness:   Patient is a 60year old male current 2 pack a week smoker with a PMH notable for left brain CVA (2012), HTN, HLD, and depression who presents to our office today for further e Smokeless tobacco: Never Used    Alcohol use: Yes      Alcohol/week: 2.0 standard drinks      Types: 2 Cans of beer per week      Comment: twice weekly    Drug use:  No        Current Medications:  Current Outpatient Medications   Medication Sig Dispense Re   BUN 16 11/04/2019      11/04/2019     K 4.1 11/04/2019      11/04/2019     CO2 27.0 11/04/2019     GLU 89 11/04/2019     CA 9.2 11/04/2019     ALB 4.1 03/08/2019     ALKPHO 78 03/08/2019     TP 7.5 03/08/2019     AST 16 03/08/2019     ALT 3 possible posterior circulation symptoms. He is on antiplatelet as well as anticholesterol medication but he does continue to smoke at an increasing amount up to 2 packs of cigarettes a week.   Recommendation is for a CTA of the carotid arteries to determin

## 2020-01-13 NOTE — ANESTHESIA PROCEDURE NOTES
Airway  Date/Time: 1/13/2020 7:35 AM  Urgency: Elective      General Information and Staff    Patient location during procedure: OR  Anesthesiologist: Nora Heck MD  Performed: anesthesiologist     Indications and Patient Condition  Indications for air

## 2020-01-13 NOTE — PROGRESS NOTES
[place] [facility]    Progress Note    [address] Patient Status:  Inpatient    1960 MRN [id number]   Location [facility] 2W/SW Attending Shiva Jaimes MD   Hosp Day # 0 PCP Kulwinder Schmitz MD        Subjective:     Darrick Herman MEDS,       History of CVA (cerebrovascular accident)  CONT TO USE TOBACCO.              Results:     Lab Results   Component Value Date    WBC 7.5 01/13/2020    HGB 12.7 (L) 01/13/2020    HCT 37.3 (L) 01/13/2020    .0 01/13/2020    CREATSERUM 0.95 0

## 2020-01-13 NOTE — ANESTHESIA PROCEDURE NOTES
Arterial Line  Date/Time: 1/13/2020 7:30 AM  Performed by: Geeta Tavarez MD  Authorized by: Geeta Tavarez MD     General Information and Staff    Procedure Start:  1/13/2020 7:30 AM  Procedure End:  1/13/2020 7:35 AM  Anesthesiologist:  Geeta Tavarez,

## 2020-01-13 NOTE — ANESTHESIA PREPROCEDURE EVALUATION
Anesthesia PreOp Note    HPI:     Mary Vásquez is a 60year old male who presents for preoperative consultation requested by: Alicia Valadez MD    Date of Surgery: 1/13/2020    Procedure(s):  CAROTID ENDARTERECTOMY  Indication: Carotid stenosis, mg total) by mouth daily. , Disp: 90 tablet, Rfl: 3, 1/11/2020  Lisinopril-hydroCHLOROthiazide 20-25 MG Oral Tab, Take 1 tablet by mouth once daily. , Disp: 90 tablet, Rfl: 1, 1/13/2020 at 0400  simvastatin 10 MG Oral Tab, Take 1 tablet (10 mg total) by mout on file      Stress: Not on file    Relationships      Social connections:        Talks on phone: Not on file        Gets together: Not on file        Attends Ten[employer] service: Not on file        Active member of club or organization: Not on file        A 166   Temp:  97.6 °F (36.4 °C)   TempSrc:  Oral   SpO2:  97%   Weight: 86.2 kg (190 lb) 82.1 kg (181 lb)   Height: 1.829 m (6') 1.829 m (6')        Anesthesia Evaluation     Patient summary reviewed and Nursing notes reviewed    Airway   Mallampati: II  TM

## 2020-01-13 NOTE — PLAN OF CARE
Problem: Patient Centered Care  Goal: Patient preferences are identified and integrated in the patient's plan of care  Description  Interventions:  - What would you like us to know as we care for you?  I want to eat  - Provide timely, complete, and accura life threatening arrhythmias  - Monitor electrolytes and administer replacement therapy as ordered  Outcome: Progressing     Problem: GENITOURINARY - ADULT  Goal: Absence of urinary retention  Description  INTERVENTIONS:  - Assess patient’s ability to void

## 2020-01-13 NOTE — INTERVAL H&P NOTE
Patient's CTA showed critical stenosis of his right internal carotid artery. The patient underwent a stress test which was abnormal and was referred to cardiology for further studies and/or operative clearance.  Cardiology noted that the stress test abnorma

## 2020-01-13 NOTE — OPERATIVE REPORT
CV SURGERY OPERATIVE NOTE    DATE 1-    Pre op Diagnosis: Right carotid stenosis  Post op Diagnosis: SAME    Procedure: Right Carotid Endarterectomy with Pericardial Patch Reconstruction  Cerebral Oximetry Monitoring  Completion Doppler Exam    Surg smooth, glistening endarterectomized surface with no distal flap elevation. The artery was then reconstructed with a bovine pericardial patch and 6-0 prolene suture.   Air was evacuated and flow was established first up the external then into the internal

## 2020-01-14 VITALS
SYSTOLIC BLOOD PRESSURE: 117 MMHG | TEMPERATURE: 98 F | DIASTOLIC BLOOD PRESSURE: 76 MMHG | OXYGEN SATURATION: 96 % | HEIGHT: 72 IN | BODY MASS INDEX: 23.79 KG/M2 | RESPIRATION RATE: 18 BRPM | WEIGHT: 175.63 LBS | HEART RATE: 67 BPM

## 2020-01-14 LAB
ANION GAP SERPL CALC-SCNC: 4 MMOL/L (ref 0–18)
BUN BLD-MCNC: 13 MG/DL (ref 7–18)
BUN/CREAT SERPL: 14.6 (ref 10–20)
CALCIUM BLD-MCNC: 8 MG/DL (ref 8.5–10.1)
CHLORIDE SERPL-SCNC: 108 MMOL/L (ref 98–112)
CO2 SERPL-SCNC: 26 MMOL/L (ref 21–32)
CREAT BLD-MCNC: 0.89 MG/DL (ref 0.7–1.3)
DEPRECATED RDW RBC AUTO: 41.3 FL (ref 35.1–46.3)
ERYTHROCYTE [DISTWIDTH] IN BLOOD BY AUTOMATED COUNT: 12.5 % (ref 11–15)
GLUCOSE BLD-MCNC: 112 MG/DL (ref 70–99)
HCT VFR BLD AUTO: 36 % (ref 39–53)
HGB BLD-MCNC: 12.2 G/DL (ref 13–17.5)
MCH RBC QN AUTO: 30.4 PG (ref 26–34)
MCHC RBC AUTO-ENTMCNC: 33.9 G/DL (ref 31–37)
MCV RBC AUTO: 89.8 FL (ref 80–100)
OSMOLALITY SERPL CALC.SUM OF ELEC: 287 MOSM/KG (ref 275–295)
PLATELET # BLD AUTO: 231 10(3)UL (ref 150–450)
POTASSIUM SERPL-SCNC: 3.8 MMOL/L (ref 3.5–5.1)
RBC # BLD AUTO: 4.01 X10(6)UL (ref 4.3–5.7)
SODIUM SERPL-SCNC: 138 MMOL/L (ref 136–145)
WBC # BLD AUTO: 8.5 X10(3) UL (ref 4–11)

## 2020-01-14 PROCEDURE — 99239 HOSP IP/OBS DSCHRG MGMT >30: CPT | Performed by: HOSPITALIST

## 2020-01-14 RX ORDER — NICOTINE 21 MG/24HR
1 PATCH, TRANSDERMAL 24 HOURS TRANSDERMAL EVERY 24 HOURS
Qty: 30 PATCH | Refills: 0 | Status: SHIPPED | OUTPATIENT
Start: 2020-01-14 | End: 2020-02-25

## 2020-01-14 RX ORDER — ACETAMINOPHEN 325 MG/1
650 TABLET ORAL EVERY 4 HOURS PRN
Qty: 30 TABLET | Refills: 0 | Status: SHIPPED | OUTPATIENT
Start: 2020-01-14

## 2020-01-14 RX ORDER — POTASSIUM CHLORIDE 20 MEQ/1
20 TABLET, EXTENDED RELEASE ORAL ONCE
Status: COMPLETED | OUTPATIENT
Start: 2020-01-14 | End: 2020-01-14

## 2020-01-14 NOTE — PAYOR COMM NOTE
--------------  DISCHARGE REVIEW    Payor: Maria Ines Monk  #:  [id number]  Authorization Number: [id number]    Admit date: 1/13/20  Admit time:  0957  Discharge Date: 1/14/2020  1:23 PM     Admitting Physician: Buren Meigs, MD  Att       Physical Exam:  Blood pressure (!) 153/95, pulse 62, temperature 97.8 °F (36.6 °C), temperature source Temporal, resp. rate 18, height 6' (1.829 m), weight 175 lb 9.6 oz (79.7 kg), SpO2 97 %.   General: NAD  Neck: No JVD  Lungs: clear bilaterally an

## 2020-01-14 NOTE — PLAN OF CARE
Problem: CARDIOVASCULAR - ADULT  Goal: Maintains optimal cardiac output and hemodynamic stability  Description  INTERVENTIONS:  - Monitor vital signs, rhythm, and trends  - Monitor for bleeding, hypotension and signs of decreased cardiac output  - Evalua indicated  Outcome: Progressing     Problem: PAIN - ADULT  Goal: Verbalizes/displays adequate comfort level or patient's stated pain goal  Description  INTERVENTIONS:  - Encourage pt to monitor pain and request assistance  - Assess pain using appropriate p

## 2020-01-14 NOTE — PLAN OF CARE
POD #1 right CEA, AOx4, neurologically intact. Up in chair and ambulated in sinha, voiding appropriately, minimal pain after drain was removed this AM. Tolerating liquids and advancing diet. BP well-controlled. Will discharge today per CV surgery.     Addend signs of decreased cardiac output  - Evaluate effectiveness of vasoactive medications to optimize hemodynamic stability  - Monitor arterial and/or venous puncture sites for bleeding and/or hematoma  - Assess quality of pulses, skin color and temperature  - monitor pain and request assistance  - Assess pain using appropriate pain scale  - Administer analgesics based on type and severity of pain and evaluate response  - Implement non-pharmacological measures as appropriate and evaluate response  - Consider cul

## 2020-01-14 NOTE — PROGRESS NOTES
[place] [facility]    Progress Note    [address] Patient Status:  Inpatient    1960 MRN [id number]   Location [facility] 2W/SW Attending Hawk Cary MD   Hosp Day # 1 PCP Kael Mccarty MD     Subjective:  Pt.  Sitting S1, S2  Abdomen: Soft, NT/ND, BS+x4, no flatus, no BM   Extremities: Warm, dry, no LE edema bilat  Pulses: 1+ bilat DP  Skin: right carotid incision drsg: CDI w/neck drain=25cc/12 hours  Neurological:  AAOx3, MAEW    Assessment/Plan:  S/P RIGHT CAROTID END

## 2020-01-15 ENCOUNTER — PATIENT OUTREACH (OUTPATIENT)
Dept: CASE MANAGEMENT | Age: 60
End: 2020-01-15

## 2020-01-15 NOTE — DISCHARGE SUMMARY
[place] [facility]    Discharge Summary    [address] [facility] Patient Status:  Inpatient    1960 MRN [id number]   Location [facility] 2W/SW Attending No att. providers found   [facility] Day # 1 PCP Valeria Gil MD     Date of Admissio Good    Discharge Medications:      Discharge Medications      START taking these medications      Instructions Prescription details   acetaminophen 325 MG Tabs  Commonly known as:  TYLENOL      Take 2 tablets (650 mg total) by mouth every 4 (four) hours a

## 2020-01-16 ENCOUNTER — TELEPHONE (OUTPATIENT)
Dept: GASTROENTEROLOGY | Facility: CLINIC | Age: 60
End: 2020-01-16

## 2020-01-16 ENCOUNTER — OFFICE VISIT (OUTPATIENT)
Dept: GASTROENTEROLOGY | Facility: CLINIC | Age: 60
End: 2020-01-16
Payer: COMMERCIAL

## 2020-01-16 VITALS
SYSTOLIC BLOOD PRESSURE: 116 MMHG | BODY MASS INDEX: 24.11 KG/M2 | WEIGHT: 178 LBS | DIASTOLIC BLOOD PRESSURE: 71 MMHG | HEIGHT: 72 IN | HEART RATE: 89 BPM

## 2020-01-16 DIAGNOSIS — Z86.010 HISTORY OF COLON POLYPS: Primary | ICD-10-CM

## 2020-01-16 PROCEDURE — S0285 CNSLT BEFORE SCREEN COLONOSC: HCPCS | Performed by: INTERNAL MEDICINE

## 2020-01-16 NOTE — PROGRESS NOTES
Elmo Humphreys is a 60year old male. HPI:   Patient presents with:  Colonoscopy Screening    The patient is a 71-year-old male who has a history of anxiety, prior CVA who has undergone a recent right carotid endarterectomy surgery.   He is here toda week    Drug use: Yes      Frequency: 1.0 times per week      Types: Cannabis       Medications (Active prior to today's visit):  Current Outpatient Medications   Medication Sig Dispense Refill   • PEG 3350-KCl-NaBcb-NaCl-NaSulf (COLYTE WITH FLAVOR PACKS) get approval from his surgeon regarding timing, he just had his carotid surgery and would anticipate pushing colon screening out 3 to 6 months. The patient is agreeable to this plan.   Also need to get approval from his surgeon regarding holding Plavix for

## 2020-01-16 NOTE — TELEPHONE ENCOUNTER
Scheduled for:  COLON 50201  Provider Name: Dr Casper Napoles  Date:  04/06/2020  Location: [facility]    Sedation:  MAC  Time:  8:15AM  Prep: Colyte  Meds/Allergies Reconciled?: Physician reviewed   Diagnosis with codes:  Hx of Colon Polyps Z86.010  Was patient informed

## 2020-01-16 NOTE — PATIENT INSTRUCTIONS
Colonoscopy for history of colon polyps   - colyte prep  - MAC sedation  - check with surgeon regarding timing and if we can hold the Plavix 5 days ( stay on aspirin)

## 2020-01-17 NOTE — TELEPHONE ENCOUNTER
Request for cardiac clearance and Plavix orders faxed to Dr. Cheryle Cabrera at [phone] with return confirmation.   Phone #[phone]      Awaiting orders

## 2020-01-17 NOTE — TELEPHONE ENCOUNTER
Pt will need to get the clearance for this from cardiology as he recently had a procedure done--pls fwd to dr Vitaliy Kate offices --if it is screening , I thought as per GI notes we might wait 6mns   Will forward to GI as FYI

## 2020-01-17 NOTE — TELEPHONE ENCOUNTER
Patient scheduled for a  colonoscopy on 04/06/2020. Please provide Plavix orders. Thank you    Message sent to Dr. Oxana Horowitz.      Awaiting orders.

## 2020-02-03 ENCOUNTER — MED REC SCAN ONLY (OUTPATIENT)
Dept: INTERNAL MEDICINE CLINIC | Facility: CLINIC | Age: 60
End: 2020-02-03

## 2020-02-13 NOTE — TELEPHONE ENCOUNTER
Please do not close encounter. Received fax back from Dr Wendy Coates, stating pt sees Dr Abarca Miss [phone] cardio. I spoke to Payton Smyth and she instructed me to fax clearance and Plavix order request to [phone]. This was done.  Received fax confi

## 2020-02-19 NOTE — TELEPHONE ENCOUNTER
Left message on patient's voicemail to call back. Please do not close encounter. Dr Tejal Collins, I spoke to Trinh Calderon at Dr Iain Duvall' office--we received their note, placed on your desk.  Pt is going to be having endarterectomy in a few days, then will follow

## 2020-02-25 DIAGNOSIS — I10 ESSENTIAL HYPERTENSION: ICD-10-CM

## 2020-02-25 DIAGNOSIS — I63.9 CEREBROVASCULAR ACCIDENT (CVA), UNSPECIFIED MECHANISM (HCC): ICD-10-CM

## 2020-02-26 RX ORDER — CLOPIDOGREL BISULFATE 75 MG/1
75 TABLET ORAL DAILY
Qty: 90 TABLET | Refills: 1 | Status: SHIPPED | OUTPATIENT
Start: 2020-02-26 | End: 2020-09-21

## 2020-02-26 RX ORDER — NICOTINE 21 MG/24HR
1 PATCH, TRANSDERMAL 24 HOURS TRANSDERMAL EVERY 24 HOURS
Qty: 30 PATCH | Refills: 1 | Status: SHIPPED | OUTPATIENT
Start: 2020-02-26 | End: 2021-03-15

## 2020-02-26 RX ORDER — SIMVASTATIN 10 MG
10 TABLET ORAL NIGHTLY
Qty: 90 TABLET | Refills: 1 | Status: SHIPPED | OUTPATIENT
Start: 2020-02-26 | End: 2020-09-21

## 2020-02-26 RX ORDER — LISINOPRIL AND HYDROCHLOROTHIAZIDE 25; 20 MG/1; MG/1
1 TABLET ORAL
Qty: 90 TABLET | Refills: 1 | Status: SHIPPED | OUTPATIENT
Start: 2020-02-26 | End: 2020-09-21

## 2020-02-26 NOTE — TELEPHONE ENCOUNTER
HOSPITALIST NURSE   TELEPHONE NOTE    Refill request received by the Hospitalist office. Hospitalist team unable to approve refills. Request routed to PCPs triage staff.

## 2020-02-27 NOTE — TELEPHONE ENCOUNTER
Refill passed per [facility] protocol.     Hypertensive Medications  Protocol Criteria:  · Appointment scheduled in the past 6 months or in the next 3 months  · BMP or CMP in the past 12 months  · Creatinine result < 2  Recent Outpatient Visits Visit    2 months ago Occlusion and stenosis of bilateral carotid arteries    [facility] Ambika Brandon MD    Office Visit    3 months ago Physical exam    Lynette Decker MD    Office Visi

## 2020-02-27 NOTE — TELEPHONE ENCOUNTER
Per EMR pt PCP is Dr Rukhsana Adkins, pls advise, thanks in advance. See other message. LR 1-14-20 by Mee York MD    Review pended refill request as it does not fall under a protocol.   Requested Prescriptions     Pending Prescriptions Disp Ref

## 2020-02-27 NOTE — TELEPHONE ENCOUNTER
See other refill request/ message. Review pended refill request as it does not fall under a protocol.   Requested Prescriptions     Pending Prescriptions Disp Refills   • Clopidogrel Bisulfate 75 MG Oral Tab 90 tablet 1     Sig: Take 1 tablet (75 mg tot

## 2020-03-10 NOTE — TELEPHONE ENCOUNTER
I have not heard back from patient. I left another voice message to call as soon as possible regarding 4/6 colonoscopy appt with Dr Selwyn Gunn may have to be cancelled. Letter also mailed to patient.     I spoke to Dr Ana Rosa Smyth' office, they state cannot

## 2020-03-11 NOTE — TELEPHONE ENCOUNTER
It appears that patient had endarterectomy in January with Dr Joni Oconnell, ph [phone]. I spoke to Dolores in his office, she will send message to RN.  I also faxed cover sheet requesting if patient is clear for 4/6 colonoscopy, and may pt hold Plavix--if so,

## 2020-03-13 NOTE — TELEPHONE ENCOUNTER
Fax from DR. Evans's office received stating Dr. Moreno De La Rosa did not start this patient on Plavix and defers to cardiologist Dr. Carolin Troncoso for orders.     Request for Plavix order faxed to Dr. Carolin Troncoso at [phone] with return confirmati

## 2020-03-13 NOTE — TELEPHONE ENCOUNTER
Received a Call from [place] from Dr. Melchor Anguillan office phone # [phone] (can call her directly at this number). Per Andreea Moscoso did not prescribe Plavix to the patient.   [place] said that she faxed the Anticoagulant adjustment form, but this RN c

## 2020-03-16 ENCOUNTER — TELEPHONE (OUTPATIENT)
Dept: CARDIOLOGY | Age: 60
End: 2020-03-16

## 2020-03-17 ENCOUNTER — TELEPHONE (OUTPATIENT)
Dept: CARDIOLOGY | Age: 60
End: 2020-03-17

## 2020-03-17 NOTE — TELEPHONE ENCOUNTER
I spoke to ST SYLVIA SMITH in Dr Nicole Perkins' office [phone]. He states he will fax 2 separate notes, giving both cardio clearance and Plavix orders.  I receive note stating \"pt may hold Plavix 5 days prior to GI procedure and needs to resume ASA after proced

## 2020-03-18 NOTE — TELEPHONE ENCOUNTER
JOSE Ellison. We have cardio clearance and order to hold Plavix below prior to 4/6 colon. Thanks. Received note from Dr Regla Ardon, using face sheet that we faxed to him. May hold Plavix 5 days prior to procedure. Resume after procedure.  Has cardio

## 2020-03-19 NOTE — TELEPHONE ENCOUNTER
I was finally able to reach patient and gave cardio clearance and Plavix orders below. I informed that it is possible that his procedure may have to be rescheduled due to [address] [place] [address] [place] Virus issues, but that we were taking it week at a time.      Art Wolfe

## 2020-03-24 ENCOUNTER — TELEPHONE (OUTPATIENT)
Dept: GASTROENTEROLOGY | Facility: CLINIC | Age: 60
End: 2020-03-24

## 2020-03-24 DIAGNOSIS — Z86.010 PERSONAL HISTORY OF COLONIC POLYPS: Primary | ICD-10-CM

## 2020-03-24 NOTE — TELEPHONE ENCOUNTER
Per phone room TE, pt is canceling his CLN procedure. Pt will CB to reschedule. Canceled in [address] I sent an electronic CANCEL request to Endo Scheduling and received a confirmation today. TE closed.           Canceled for:  COLON [id number]  Provider

## 2020-09-18 DIAGNOSIS — I10 ESSENTIAL HYPERTENSION: ICD-10-CM

## 2020-09-18 DIAGNOSIS — I63.9 CEREBROVASCULAR ACCIDENT (CVA), UNSPECIFIED MECHANISM (HCC): ICD-10-CM

## 2020-09-21 RX ORDER — SIMVASTATIN 10 MG
TABLET ORAL
Qty: 90 TABLET | Refills: 3 | Status: SHIPPED | OUTPATIENT
Start: 2020-09-21 | End: 2021-08-30

## 2020-09-21 RX ORDER — LISINOPRIL AND HYDROCHLOROTHIAZIDE 25; 20 MG/1; MG/1
TABLET ORAL
Qty: 90 TABLET | Refills: 3 | Status: SHIPPED | OUTPATIENT
Start: 2020-09-21 | End: 2021-08-30

## 2020-09-21 RX ORDER — CLOPIDOGREL BISULFATE 75 MG/1
TABLET ORAL
Qty: 90 TABLET | Refills: 3 | Status: ON HOLD | OUTPATIENT
Start: 2020-09-21 | End: 2021-03-26

## 2020-12-28 ENCOUNTER — TELEPHONE (OUTPATIENT)
Dept: INTERNAL MEDICINE CLINIC | Facility: CLINIC | Age: 60
End: 2020-12-28

## 2020-12-28 NOTE — TELEPHONE ENCOUNTER
Refill request:    Not listed:    Need Medication - Metformin Tab 500mg Plain-G    **In order for a brand name product to be dispensed, the prescriber must handwrite \" Brand Necessary\" or Brand Medically Necessary\" in the space below:        Current Out

## 2020-12-28 NOTE — TELEPHONE ENCOUNTER
Left message for patient regarding medication request.     Spoke to Kriss Champion from [address] [place] [address] who states patient requested Metformin 500 mg and Lisinopril 10 mg. Kriss Champion was unable to find medication history on both medications.  Medication history

## 2021-03-08 ENCOUNTER — HOSPITAL ENCOUNTER (EMERGENCY)
Facility: HOSPITAL | Age: 61
Discharge: HOME | End: 2021-03-08
Attending: EMERGENCY MEDICINE
Payer: COMMERCIAL

## 2021-03-08 ENCOUNTER — APPOINTMENT (OUTPATIENT)
Dept: CT IMAGING | Facility: HOSPITAL | Age: 61
End: 2021-03-08
Attending: EMERGENCY MEDICINE
Payer: COMMERCIAL

## 2021-03-08 VITALS
HEART RATE: 84 BPM | TEMPERATURE: 97 F | OXYGEN SATURATION: 96 % | RESPIRATION RATE: 22 BRPM | SYSTOLIC BLOOD PRESSURE: 176 MMHG | DIASTOLIC BLOOD PRESSURE: 81 MMHG

## 2021-03-08 DIAGNOSIS — N20.1 RIGHT URETERAL STONE: Primary | ICD-10-CM

## 2021-03-08 LAB
ANION GAP SERPL CALC-SCNC: 4 MMOL/L (ref 0–18)
BASOPHILS # BLD AUTO: 0.04 X10(3) UL (ref 0–0.2)
BASOPHILS NFR BLD AUTO: 0.4 %
BUN BLD-MCNC: 24 MG/DL (ref 7–18)
BUN/CREAT SERPL: 23.5 (ref 10–20)
CALCIUM BLD-MCNC: 9.1 MG/DL (ref 8.5–10.1)
CHLORIDE SERPL-SCNC: 105 MMOL/L (ref 98–112)
CO2 SERPL-SCNC: 26 MMOL/L (ref 21–32)
CREAT BLD-MCNC: 1.02 MG/DL
DEPRECATED RDW RBC AUTO: 39.1 FL (ref 35.1–46.3)
EOSINOPHIL # BLD AUTO: 0.37 X10(3) UL (ref 0–0.7)
EOSINOPHIL NFR BLD AUTO: 3.8 %
ERYTHROCYTE [DISTWIDTH] IN BLOOD BY AUTOMATED COUNT: 12.2 % (ref 11–15)
GLUCOSE BLD-MCNC: 126 MG/DL (ref 70–99)
HCT VFR BLD AUTO: 43.1 %
HGB BLD-MCNC: 14.3 G/DL
IMM GRANULOCYTES # BLD AUTO: 0.02 X10(3) UL (ref 0–1)
IMM GRANULOCYTES NFR BLD: 0.2 %
LYMPHOCYTES # BLD AUTO: 1.53 X10(3) UL (ref 1–4)
LYMPHOCYTES NFR BLD AUTO: 15.6 %
MCH RBC QN AUTO: 29.3 PG (ref 26–34)
MCHC RBC AUTO-ENTMCNC: 33.2 G/DL (ref 31–37)
MCV RBC AUTO: 88.3 FL
MONOCYTES # BLD AUTO: 0.52 X10(3) UL (ref 0.1–1)
MONOCYTES NFR BLD AUTO: 5.3 %
NEUTROPHILS # BLD AUTO: 7.3 X10 (3) UL (ref 1.5–7.7)
NEUTROPHILS # BLD AUTO: 7.3 X10(3) UL (ref 1.5–7.7)
NEUTROPHILS NFR BLD AUTO: 74.7 %
OSMOLALITY SERPL CALC.SUM OF ELEC: 286 MOSM/KG (ref 275–295)
PLATELET # BLD AUTO: 256 10(3)UL (ref 150–450)
POTASSIUM SERPL-SCNC: 4.6 MMOL/L (ref 3.5–5.1)
RBC # BLD AUTO: 4.88 X10(6)UL
SODIUM SERPL-SCNC: 135 MMOL/L (ref 136–145)
WBC # BLD AUTO: 9.8 X10(3) UL (ref 4–11)

## 2021-03-08 PROCEDURE — 85025 COMPLETE CBC W/AUTO DIFF WBC: CPT | Performed by: EMERGENCY MEDICINE

## 2021-03-08 PROCEDURE — 96374 THER/PROPH/DIAG INJ IV PUSH: CPT

## 2021-03-08 PROCEDURE — 74176 CT ABD & PELVIS W/O CONTRAST: CPT | Performed by: EMERGENCY MEDICINE

## 2021-03-08 PROCEDURE — 80048 BASIC METABOLIC PNL TOTAL CA: CPT | Performed by: EMERGENCY MEDICINE

## 2021-03-08 PROCEDURE — 96361 HYDRATE IV INFUSION ADD-ON: CPT

## 2021-03-08 PROCEDURE — 99284 EMERGENCY DEPT VISIT MOD MDM: CPT

## 2021-03-08 RX ORDER — TRAMADOL HYDROCHLORIDE 50 MG/1
TABLET ORAL EVERY 6 HOURS PRN
Qty: 10 TABLET | Refills: 0 | Status: ON HOLD | OUTPATIENT
Start: 2021-03-08 | End: 2021-03-10

## 2021-03-08 RX ORDER — KETOROLAC TROMETHAMINE 15 MG/ML
15 INJECTION, SOLUTION INTRAMUSCULAR; INTRAVENOUS ONCE
Status: COMPLETED | OUTPATIENT
Start: 2021-03-08 | End: 2021-03-08

## 2021-03-08 NOTE — ED INITIAL ASSESSMENT (HPI)
S: Right flank pain. B: Woke patient out of sleep, back pain now radiating into abdomen. Denies fevers vomiting hematuria dysuria.

## 2021-03-08 NOTE — ED PROVIDER NOTES
Patient Seen in: [facility] Emergency Department    History   No chief complaint on file.       HPI    69-year-old male presents the ER with complaints of right flank pain that started approximately 1 hour prior to arrival.  Patient has a past medica Never Used    Vaping Use      Vaping Use: Former    Substance and Sexual Activity      Alcohol use:  Yes        Alcohol/week: 2.0 standard drinks        Types: 2 Cans of beer per week      Drug use: Yes        Frequency: 1.0 times per week        Types: Can eye protection, and gloves were worn throughout the duration of the exam.  Handwashing was performed prior to and after the exam.  Stethoscope and any equipment used during my examination was cleaned with super sani-cloth germicidal wipes following the exa Abnormality         Status                     ---------                               -----------         ------                     CBC W/ DIFFERENTIAL[[id number]]                              Final result                 Please scan shows a 3 mm stone at the ureterovesical junction. Patient states his pain is improved after Toradol. Patient given a strainer and instructed to follow-up with his primary care physician if his kidney stone does not pass.   Patient given a prescripti

## 2021-03-09 ENCOUNTER — APPOINTMENT (OUTPATIENT)
Dept: GENERAL RADIOLOGY | Facility: HOSPITAL | Age: 61
DRG: 660 | End: 2021-03-09
Attending: EMERGENCY MEDICINE
Payer: COMMERCIAL

## 2021-03-09 ENCOUNTER — APPOINTMENT (OUTPATIENT)
Dept: GENERAL RADIOLOGY | Facility: HOSPITAL | Age: 61
DRG: 660 | End: 2021-03-09
Attending: UROLOGY
Payer: COMMERCIAL

## 2021-03-09 ENCOUNTER — ANESTHESIA EVENT (OUTPATIENT)
Dept: SURGERY | Facility: HOSPITAL | Age: 61
DRG: 660 | End: 2021-03-09
Payer: COMMERCIAL

## 2021-03-09 ENCOUNTER — HOSPITAL ENCOUNTER (INPATIENT)
Facility: HOSPITAL | Age: 61
LOS: 1 days | Discharge: HOME | DRG: 660 | End: 2021-03-10
Attending: EMERGENCY MEDICINE | Admitting: HOSPITALIST
Payer: COMMERCIAL

## 2021-03-09 ENCOUNTER — ANESTHESIA (OUTPATIENT)
Dept: SURGERY | Facility: HOSPITAL | Age: 61
DRG: 660 | End: 2021-03-09
Payer: COMMERCIAL

## 2021-03-09 DIAGNOSIS — N13.5 OBSTRUCTION OF RIGHT URETEROPELVIC JUNCTION (UPJ): ICD-10-CM

## 2021-03-09 DIAGNOSIS — D72.829 LEUKOCYTOSIS, UNSPECIFIED TYPE: ICD-10-CM

## 2021-03-09 DIAGNOSIS — N28.9 RENAL INSUFFICIENCY: ICD-10-CM

## 2021-03-09 DIAGNOSIS — N13.2 URETERAL STONE WITH HYDRONEPHROSIS: Primary | ICD-10-CM

## 2021-03-09 LAB
ANION GAP SERPL CALC-SCNC: 5 MMOL/L (ref 0–18)
BASOPHILS # BLD AUTO: 0.05 X10(3) UL (ref 0–0.2)
BASOPHILS NFR BLD AUTO: 0.3 %
BUN BLD-MCNC: 25 MG/DL (ref 7–18)
BUN/CREAT SERPL: 19.1 (ref 10–20)
CALCIUM BLD-MCNC: 9.2 MG/DL (ref 8.5–10.1)
CHLORIDE SERPL-SCNC: 106 MMOL/L (ref 98–112)
CO2 SERPL-SCNC: 27 MMOL/L (ref 21–32)
CREAT BLD-MCNC: 1.31 MG/DL
DEPRECATED RDW RBC AUTO: 39.3 FL (ref 35.1–46.3)
EOSINOPHIL # BLD AUTO: 0.12 X10(3) UL (ref 0–0.7)
EOSINOPHIL NFR BLD AUTO: 0.7 %
ERYTHROCYTE [DISTWIDTH] IN BLOOD BY AUTOMATED COUNT: 12.3 % (ref 11–15)
GLUCOSE BLD-MCNC: 176 MG/DL (ref 70–99)
HCT VFR BLD AUTO: 42 %
HGB BLD-MCNC: 14.5 G/DL
IMM GRANULOCYTES # BLD AUTO: 0.09 X10(3) UL (ref 0–1)
IMM GRANULOCYTES NFR BLD: 0.5 %
LYMPHOCYTES # BLD AUTO: 1.2 X10(3) UL (ref 1–4)
LYMPHOCYTES NFR BLD AUTO: 6.8 %
MCH RBC QN AUTO: 30.1 PG (ref 26–34)
MCHC RBC AUTO-ENTMCNC: 34.5 G/DL (ref 31–37)
MCV RBC AUTO: 87.1 FL
MONOCYTES # BLD AUTO: 0.53 X10(3) UL (ref 0.1–1)
MONOCYTES NFR BLD AUTO: 3 %
NEUTROPHILS # BLD AUTO: 15.75 X10 (3) UL (ref 1.5–7.7)
NEUTROPHILS # BLD AUTO: 15.75 X10(3) UL (ref 1.5–7.7)
NEUTROPHILS NFR BLD AUTO: 88.7 %
OSMOLALITY SERPL CALC.SUM OF ELEC: 295 MOSM/KG (ref 275–295)
PLATELET # BLD AUTO: 265 10(3)UL (ref 150–450)
POTASSIUM SERPL-SCNC: 4 MMOL/L (ref 3.5–5.1)
RBC # BLD AUTO: 4.82 X10(6)UL
SARS-COV-2 RNA RESP QL NAA+PROBE: NOT DETECTED
SODIUM SERPL-SCNC: 138 MMOL/L (ref 136–145)
WBC # BLD AUTO: 17.7 X10(3) UL (ref 4–11)

## 2021-03-09 PROCEDURE — 74018 RADEX ABDOMEN 1 VIEW: CPT | Performed by: EMERGENCY MEDICINE

## 2021-03-09 PROCEDURE — 0T768DZ DILATION OF RIGHT URETER WITH INTRALUMINAL DEVICE, VIA NATURAL OR ARTIFICIAL OPENING ENDOSCOPIC: ICD-10-PCS | Performed by: UROLOGY

## 2021-03-09 PROCEDURE — 52332 CYSTOSCOPY AND TREATMENT: CPT | Performed by: UROLOGY

## 2021-03-09 PROCEDURE — 71045 X-RAY EXAM CHEST 1 VIEW: CPT | Performed by: EMERGENCY MEDICINE

## 2021-03-09 PROCEDURE — 99222 1ST HOSP IP/OBS MODERATE 55: CPT | Performed by: HOSPITALIST

## 2021-03-09 PROCEDURE — BT1D1ZZ FLUOROSCOPY OF RIGHT KIDNEY, URETER AND BLADDER USING LOW OSMOLAR CONTRAST: ICD-10-PCS | Performed by: UROLOGY

## 2021-03-09 PROCEDURE — 99223 1ST HOSP IP/OBS HIGH 75: CPT | Performed by: UROLOGY

## 2021-03-09 DEVICE — URETERAL STENT
Type: IMPLANTABLE DEVICE | Site: URETER | Status: FUNCTIONAL
Brand: ASCERTA™

## 2021-03-09 RX ORDER — ONDANSETRON 2 MG/ML
4 INJECTION INTRAMUSCULAR; INTRAVENOUS EVERY 6 HOURS PRN
Status: DISCONTINUED | OUTPATIENT
Start: 2021-03-09 | End: 2021-03-10

## 2021-03-09 RX ORDER — HYDROMORPHONE HYDROCHLORIDE 1 MG/ML
0.4 INJECTION, SOLUTION INTRAMUSCULAR; INTRAVENOUS; SUBCUTANEOUS EVERY 5 MIN PRN
Status: DISCONTINUED | OUTPATIENT
Start: 2021-03-09 | End: 2021-03-09 | Stop reason: HOSPADM

## 2021-03-09 RX ORDER — KETOROLAC TROMETHAMINE 30 MG/ML
30 INJECTION, SOLUTION INTRAMUSCULAR; INTRAVENOUS EVERY 6 HOURS PRN
Status: DISCONTINUED | OUTPATIENT
Start: 2021-03-09 | End: 2021-03-10

## 2021-03-09 RX ORDER — MORPHINE SULFATE 4 MG/ML
4 INJECTION, SOLUTION INTRAMUSCULAR; INTRAVENOUS EVERY 2 HOUR PRN
Status: DISCONTINUED | OUTPATIENT
Start: 2021-03-09 | End: 2021-03-10

## 2021-03-09 RX ORDER — MORPHINE SULFATE 4 MG/ML
2 INJECTION, SOLUTION INTRAMUSCULAR; INTRAVENOUS EVERY 10 MIN PRN
Status: DISCONTINUED | OUTPATIENT
Start: 2021-03-09 | End: 2021-03-09 | Stop reason: HOSPADM

## 2021-03-09 RX ORDER — PRAVASTATIN SODIUM 20 MG
20 TABLET ORAL NIGHTLY
Refills: 3 | Status: DISCONTINUED | OUTPATIENT
Start: 2021-03-09 | End: 2021-03-10

## 2021-03-09 RX ORDER — HYDROCODONE BITARTRATE AND ACETAMINOPHEN 5; 325 MG/1; MG/1
1 TABLET ORAL AS NEEDED
Status: DISCONTINUED | OUTPATIENT
Start: 2021-03-09 | End: 2021-03-09 | Stop reason: HOSPADM

## 2021-03-09 RX ORDER — SODIUM CHLORIDE, SODIUM LACTATE, POTASSIUM CHLORIDE, CALCIUM CHLORIDE 600; 310; 30; 20 MG/100ML; MG/100ML; MG/100ML; MG/100ML
INJECTION, SOLUTION INTRAVENOUS CONTINUOUS
Status: DISCONTINUED | OUTPATIENT
Start: 2021-03-09 | End: 2021-03-09 | Stop reason: HOSPADM

## 2021-03-09 RX ORDER — HYDROCODONE BITARTRATE AND ACETAMINOPHEN 5; 325 MG/1; MG/1
2 TABLET ORAL AS NEEDED
Status: DISCONTINUED | OUTPATIENT
Start: 2021-03-09 | End: 2021-03-09 | Stop reason: HOSPADM

## 2021-03-09 RX ORDER — MORPHINE SULFATE 2 MG/ML
2 INJECTION, SOLUTION INTRAMUSCULAR; INTRAVENOUS EVERY 2 HOUR PRN
Status: DISCONTINUED | OUTPATIENT
Start: 2021-03-09 | End: 2021-03-10

## 2021-03-09 RX ORDER — KETOROLAC TROMETHAMINE 15 MG/ML
15 INJECTION, SOLUTION INTRAMUSCULAR; INTRAVENOUS ONCE
Status: COMPLETED | OUTPATIENT
Start: 2021-03-09 | End: 2021-03-09

## 2021-03-09 RX ORDER — PROCHLORPERAZINE EDISYLATE 5 MG/ML
5 INJECTION INTRAMUSCULAR; INTRAVENOUS ONCE AS NEEDED
Status: DISCONTINUED | OUTPATIENT
Start: 2021-03-09 | End: 2021-03-09 | Stop reason: HOSPADM

## 2021-03-09 RX ORDER — ONDANSETRON 2 MG/ML
INJECTION INTRAMUSCULAR; INTRAVENOUS AS NEEDED
Status: DISCONTINUED | OUTPATIENT
Start: 2021-03-09 | End: 2021-03-09 | Stop reason: SURG

## 2021-03-09 RX ORDER — SODIUM CHLORIDE 9 MG/ML
INJECTION, SOLUTION INTRAVENOUS CONTINUOUS
Status: DISCONTINUED | OUTPATIENT
Start: 2021-03-09 | End: 2021-03-10

## 2021-03-09 RX ORDER — MORPHINE SULFATE 10 MG/ML
6 INJECTION, SOLUTION INTRAMUSCULAR; INTRAVENOUS EVERY 10 MIN PRN
Status: DISCONTINUED | OUTPATIENT
Start: 2021-03-09 | End: 2021-03-09 | Stop reason: HOSPADM

## 2021-03-09 RX ORDER — ASPIRIN 81 MG/1
81 TABLET ORAL DAILY
Status: DISCONTINUED | OUTPATIENT
Start: 2021-03-09 | End: 2021-03-10

## 2021-03-09 RX ORDER — MORPHINE SULFATE 4 MG/ML
4 INJECTION, SOLUTION INTRAMUSCULAR; INTRAVENOUS EVERY 10 MIN PRN
Status: DISCONTINUED | OUTPATIENT
Start: 2021-03-09 | End: 2021-03-09 | Stop reason: HOSPADM

## 2021-03-09 RX ORDER — ZOLPIDEM TARTRATE 5 MG/1
5 TABLET ORAL NIGHTLY PRN
Status: DISCONTINUED | OUTPATIENT
Start: 2021-03-09 | End: 2021-03-10

## 2021-03-09 RX ORDER — DEXAMETHASONE SODIUM PHOSPHATE 4 MG/ML
VIAL (ML) INJECTION AS NEEDED
Status: DISCONTINUED | OUTPATIENT
Start: 2021-03-09 | End: 2021-03-09 | Stop reason: SURG

## 2021-03-09 RX ORDER — HYDROMORPHONE HYDROCHLORIDE 1 MG/ML
0.6 INJECTION, SOLUTION INTRAMUSCULAR; INTRAVENOUS; SUBCUTANEOUS EVERY 5 MIN PRN
Status: DISCONTINUED | OUTPATIENT
Start: 2021-03-09 | End: 2021-03-09 | Stop reason: HOSPADM

## 2021-03-09 RX ORDER — HYDROMORPHONE HYDROCHLORIDE 1 MG/ML
0.2 INJECTION, SOLUTION INTRAMUSCULAR; INTRAVENOUS; SUBCUTANEOUS EVERY 5 MIN PRN
Status: DISCONTINUED | OUTPATIENT
Start: 2021-03-09 | End: 2021-03-09 | Stop reason: HOSPADM

## 2021-03-09 RX ORDER — HALOPERIDOL 5 MG/ML
0.25 INJECTION INTRAMUSCULAR ONCE AS NEEDED
Status: DISCONTINUED | OUTPATIENT
Start: 2021-03-09 | End: 2021-03-09 | Stop reason: HOSPADM

## 2021-03-09 RX ORDER — LIDOCAINE HYDROCHLORIDE 10 MG/ML
INJECTION, SOLUTION EPIDURAL; INFILTRATION; INTRACAUDAL; PERINEURAL AS NEEDED
Status: DISCONTINUED | OUTPATIENT
Start: 2021-03-09 | End: 2021-03-09 | Stop reason: SURG

## 2021-03-09 RX ORDER — LIDOCAINE HYDROCHLORIDE 20 MG/ML
JELLY TOPICAL AS NEEDED
Status: DISCONTINUED | OUTPATIENT
Start: 2021-03-09 | End: 2021-03-09 | Stop reason: HOSPADM

## 2021-03-09 RX ORDER — ONDANSETRON 2 MG/ML
4 INJECTION INTRAMUSCULAR; INTRAVENOUS ONCE AS NEEDED
Status: DISCONTINUED | OUTPATIENT
Start: 2021-03-09 | End: 2021-03-09 | Stop reason: HOSPADM

## 2021-03-09 RX ORDER — DEXTROSE MONOHYDRATE 25 G/50ML
50 INJECTION, SOLUTION INTRAVENOUS
Status: DISCONTINUED | OUTPATIENT
Start: 2021-03-09 | End: 2021-03-09 | Stop reason: HOSPADM

## 2021-03-09 RX ORDER — LISINOPRIL AND HYDROCHLOROTHIAZIDE 25; 20 MG/1; MG/1
1 TABLET ORAL DAILY
Status: DISCONTINUED | OUTPATIENT
Start: 2021-03-09 | End: 2021-03-09 | Stop reason: SDUPTHER

## 2021-03-09 RX ORDER — TAMSULOSIN HYDROCHLORIDE 0.4 MG/1
0.4 CAPSULE ORAL DAILY
Status: DISCONTINUED | OUTPATIENT
Start: 2021-03-09 | End: 2021-03-10

## 2021-03-09 RX ORDER — NALOXONE HYDROCHLORIDE 0.4 MG/ML
80 INJECTION, SOLUTION INTRAMUSCULAR; INTRAVENOUS; SUBCUTANEOUS AS NEEDED
Status: DISCONTINUED | OUTPATIENT
Start: 2021-03-09 | End: 2021-03-09 | Stop reason: HOSPADM

## 2021-03-09 RX ORDER — MORPHINE SULFATE 4 MG/ML
4 INJECTION, SOLUTION INTRAMUSCULAR; INTRAVENOUS ONCE
Status: COMPLETED | OUTPATIENT
Start: 2021-03-09 | End: 2021-03-09

## 2021-03-09 RX ORDER — MIDAZOLAM HYDROCHLORIDE 1 MG/ML
INJECTION INTRAMUSCULAR; INTRAVENOUS AS NEEDED
Status: DISCONTINUED | OUTPATIENT
Start: 2021-03-09 | End: 2021-03-09 | Stop reason: SURG

## 2021-03-09 RX ADMIN — DEXAMETHASONE SODIUM PHOSPHATE 4 MG: 4 MG/ML VIAL (ML) INJECTION at 17:51:00

## 2021-03-09 RX ADMIN — LIDOCAINE HYDROCHLORIDE 50 MG: 10 INJECTION, SOLUTION EPIDURAL; INFILTRATION; INTRACAUDAL; PERINEURAL at 17:51:00

## 2021-03-09 RX ADMIN — SODIUM CHLORIDE: 9 INJECTION, SOLUTION INTRAVENOUS at 17:51:00

## 2021-03-09 RX ADMIN — MIDAZOLAM HYDROCHLORIDE 2 MG: 1 INJECTION INTRAMUSCULAR; INTRAVENOUS at 17:51:00

## 2021-03-09 RX ADMIN — ONDANSETRON 4 MG: 2 INJECTION INTRAMUSCULAR; INTRAVENOUS at 17:51:00

## 2021-03-09 NOTE — H&P
[address]  [address] Patient Status:  Inpatient    1960 MRN [id number]   Location [facility] 5SW/SE Attending Evangelina Bae MD   Hosp Day # 0 PCP Ingrid Tang MD     Date:  3/9/2021  Tatiana Kim of about 2.0 standard drinks of alcohol per week. He reports current drug use. Frequency: 1.00 time per week. Drug: Cannabis.     Allergies:  No Known Allergies    Home Medications:  Prior to Admission Medications   Prescriptions Last Dose Informant Patient non-tender, no carotid bruit, no jugular venous distention, no lymphadenopathy, no thyromegaly. Respiratory:  Lungs are clear to auscultation, respirations are non-labored, breath sounds are equal, symmetrical chest wall expansion.   Cardiovascular:  Catherine Gregorio findings as above. A preliminary report was issued by the [address] [place] [address] Radiology teleradiology service. There are no major discrepancies.   Remote - PS  Dictated by (CST): Margarito Torres MD on 3/08/2021 at 7:14 AM     Finalized by (CST): Margarito Torres MD on

## 2021-03-09 NOTE — ANESTHESIA PROCEDURE NOTES
Airway  Date/Time: 3/9/2021 5:51 PM  Urgency: Elective    Airway not difficult    General Information and Staff    Patient location during procedure: OR  Anesthesiologist: Ilsa Pacheco MD  Performed: anesthesiologist     Indications and Patient Condition

## 2021-03-09 NOTE — PROGRESS NOTES
Post midnight f/u note  Please refer to Dr. Chelsie Beck note for details    Pt was seen and examined, no acute events overnight  Pain controlled currently  Pt and wife still deciding wether to proceed or not with stent placement  Urology following  Cont flomax,

## 2021-03-09 NOTE — CONSULTS
[facility]  Report of Initial Consultation    Jenny Alma Patient Status:  Inpatient    1960 MRN [id number]   Location [facility] 5SW/SE Attending Lisbeth Arce MD   Hosp Day # 0 PCP Mo reports difficulty starting stream, weak and intermittent stream, sensation of incomplete bladder emptying, and nocturia x 1. He deneis daytime frequency or urinary urgency. No gross hematuria or dysuria. He is not on any BPH related medications.       L Alcohol/week: 2.0 standard drinks      Types: 2 Cans of beer per week    Drug use: Yes      Frequency: 1.0 times per week      Types: Cannabis          Current Medications:  aspirin EC tab 81 mg, 81 mg, Oral, Daily  Pravastatin Sodium (PRAVACHOL) tab 20 mg affect. Results:     Laboratory Data:  Recent Labs   Lab 03/08/21  0250 03/09/21  0402   WBC 9.8 17.7*   HGB 14.3 14.5   HCT 43.1 42.0   .0 265.0     Recent Labs   Lab 03/08/21  0250 03/09/21  0402   * 138   K 4.6 4.0    106   CO2 26. 3/09/2021 at 7:16 AM             Impression:   63year old male who presented to [facility] ER with severe right flank pain and associated nausea and vomiting. This was his second ER visit over 2 days.   CT A/P without contrast done 3/8/21 shows a 4-5 mm right UP Tammy Leon, APRN  3/9/2021      UROLOGY ATTENDING PHYSICIAN  Patient seen and examined at the bedside. I agree with the findings, assessment, and plan as noted above.  The note has been reviewed and edited to reflect my exam findings, opinion

## 2021-03-09 NOTE — ED PROVIDER NOTES
Patient Seen in: [facility] AND [facility] Emergency Department    History   No chief complaint on file. HPI    79-year-old male presents the ER with complaint of right flank pain.   Patient was seen in emergency room yesterday and diagnosed with a 3 mm obstru Transdermal Patch 24 Hr, Place 1 patch onto the skin daily. , Disp: 30 patch, Rfl: 1  acetaminophen 325 MG Oral Tab, Take 2 tablets (650 mg total) by mouth every 4 (four) hours as needed for Pain., Disp: 30 tablet, Rfl: 0, Unknown at Unknown time         Fa Fear of Current or Ex-Partner:       Emotionally Abused:       Physically Abused:       Sexually Abused:     ROS  All pertinent positives for the review of systems are mentioned in the HPI  All other organ systems are reviewed and are negative.     Constitu Normal range of motion and neck supple. Skin:     General: Skin is warm and dry. Neurological:      Mental Status: He is alert and oriented to person, place, and time. Deep Tendon Reflexes: Reflexes are normal and symmetric.    Psychiatric: Stable findings from January 8, 2020. 2. Atherosclerosis. 3. Demineralization. 4. Osteoarthritis. 5. Mild bowel distention. 6. A preliminary report was submitted and there is agreement without major discrepancies. Dictated by (CST):  Shaggy Cabrera, (15 mg Intravenous Given 3/9/21 1472)   morphINE sulfate (PF) 4 MG/ML injection 4 mg (4 mg Intravenous Given 3/9/21 3373)   cefTRIAXone Sodium (ROCEPHIN) 1 g in sodium chloride 0.9% 100 mL IVPB-ADDV (0 g Intravenous Stopped 3/9/21 6919)   Gentamicin Sulfat Impression:  Ureteral stone with hydronephrosis  (primary encounter diagnosis)  Leukocytosis, unspecified type  Renal insufficiency  Obstruction of right ureteropelvic junction (UPJ)    Disposition:  Admit    Follow-up:  No follow-up provider specified.

## 2021-03-09 NOTE — ED NOTES
Patient's wife demanding that the Patient go home on 02.  Informed her the O2 saturation here is normal.  jeff call to come speak with the patient

## 2021-03-09 NOTE — ED NOTES
Orders for admission, patient is aware of plan and ready to go upstairs. Any questions, please call ED RN Faith Carrasco  at extension [phone].    Type of COVID test sent: Rapid -  COVID Suspicion level: Low    LOC at time of transport:A & O 4 of 4   NEEDS UA

## 2021-03-09 NOTE — ANESTHESIA PREPROCEDURE EVALUATION
Anesthesia PreOp Note    HPI:     Ever Moreno is a 63year old male who presents for preoperative consultation requested by: Erika Pham MD    Date of Surgery: 3/9/2021    Procedure(s):  CYSTOSCOPY, RIGHT RETROGRADE PYELOGRAM, RIGHT Adrian Daily • Stroke [facility]) 2012    right side weakness   • Visual impairment        Past Surgical History:   Procedure Laterality Date   • ARTHROSCOPY OF JOINT UNLISTED     • BACK SURGERY  1991   • CAROTID ENDARTERECTOMY Right 1/13/2020    Performed by Tangela Garibay mg, 5 mg, Oral, Nightly PRN, Eagle Us MD  [MAR Hold] Pantoprazole Sodium (PROTONIX) 40 mg in Sodium Chloride (PF) 0.9 % 10 mL IV push, 40 mg, Intravenous, Q24H, Eagle Us MD, 40 mg at 03/09/21 0659  [START ON 3/10/2021] cefTRIAXone Sodium (R Other Topics      Concerns:         Service: Not Asked        Blood Transfusions: Not Asked        Caffeine Concern: Yes          Coffee, > 6 cups daily        Occupational Exposure: Not Asked        Hobby Hazards: Not Asked        Sleep Concern: N 03/09/2021          Vital Signs: Body mass index is 25.09 kg/m². height is 1.829 m (6') and weight is 83.9 kg (185 lb). His oral temperature is 98.6 °F (37 °C). His blood pressure is 126/65 and his pulse is 59.  His respiration is 17 and oxygen saturatio

## 2021-03-09 NOTE — ED INITIAL ASSESSMENT (HPI)
S: Right sided flank pain. B: Patient presents to ED for right sided flank pain, was here for same yesterday.

## 2021-03-10 VITALS
WEIGHT: 185 LBS | BODY MASS INDEX: 25.06 KG/M2 | OXYGEN SATURATION: 97 % | TEMPERATURE: 98 F | HEIGHT: 72 IN | SYSTOLIC BLOOD PRESSURE: 146 MMHG | RESPIRATION RATE: 15 BRPM | HEART RATE: 77 BPM | DIASTOLIC BLOOD PRESSURE: 79 MMHG

## 2021-03-10 LAB
ANION GAP SERPL CALC-SCNC: 2 MMOL/L (ref 0–18)
BASOPHILS # BLD AUTO: 0.01 X10(3) UL (ref 0–0.2)
BASOPHILS NFR BLD AUTO: 0.1 %
BUN BLD-MCNC: 16 MG/DL (ref 7–18)
BUN/CREAT SERPL: 18 (ref 10–20)
CALCIUM BLD-MCNC: 8.2 MG/DL (ref 8.5–10.1)
CHLORIDE SERPL-SCNC: 112 MMOL/L (ref 98–112)
CO2 SERPL-SCNC: 27 MMOL/L (ref 21–32)
CREAT BLD-MCNC: 0.89 MG/DL
DEPRECATED RDW RBC AUTO: 41.1 FL (ref 35.1–46.3)
EOSINOPHIL # BLD AUTO: 0.01 X10(3) UL (ref 0–0.7)
EOSINOPHIL NFR BLD AUTO: 0.1 %
ERYTHROCYTE [DISTWIDTH] IN BLOOD BY AUTOMATED COUNT: 12.5 % (ref 11–15)
GLUCOSE BLD-MCNC: 123 MG/DL (ref 70–99)
HCT VFR BLD AUTO: 37 %
HGB BLD-MCNC: 12.2 G/DL
IMM GRANULOCYTES # BLD AUTO: 0.01 X10(3) UL (ref 0–1)
IMM GRANULOCYTES NFR BLD: 0.1 %
LYMPHOCYTES # BLD AUTO: 1.47 X10(3) UL (ref 1–4)
LYMPHOCYTES NFR BLD AUTO: 19.8 %
MCH RBC QN AUTO: 29.6 PG (ref 26–34)
MCHC RBC AUTO-ENTMCNC: 33 G/DL (ref 31–37)
MCV RBC AUTO: 89.8 FL
MONOCYTES # BLD AUTO: 0.36 X10(3) UL (ref 0.1–1)
MONOCYTES NFR BLD AUTO: 4.9 %
NEUTROPHILS # BLD AUTO: 5.56 X10 (3) UL (ref 1.5–7.7)
NEUTROPHILS # BLD AUTO: 5.56 X10(3) UL (ref 1.5–7.7)
NEUTROPHILS NFR BLD AUTO: 75 %
OSMOLALITY SERPL CALC.SUM OF ELEC: 295 MOSM/KG (ref 275–295)
PLATELET # BLD AUTO: 232 10(3)UL (ref 150–450)
POTASSIUM SERPL-SCNC: 4.7 MMOL/L (ref 3.5–5.1)
RBC # BLD AUTO: 4.12 X10(6)UL
SODIUM SERPL-SCNC: 141 MMOL/L (ref 136–145)
WBC # BLD AUTO: 7.4 X10(3) UL (ref 4–11)

## 2021-03-10 PROCEDURE — 99231 SBSQ HOSP IP/OBS SF/LOW 25: CPT | Performed by: UROLOGY

## 2021-03-10 PROCEDURE — 99239 HOSP IP/OBS DSCHRG MGMT >30: CPT | Performed by: HOSPITALIST

## 2021-03-10 RX ORDER — PANTOPRAZOLE SODIUM 40 MG/1
40 TABLET, DELAYED RELEASE ORAL
Status: DISCONTINUED | OUTPATIENT
Start: 2021-03-10 | End: 2021-03-10

## 2021-03-10 RX ORDER — TAMSULOSIN HYDROCHLORIDE 0.4 MG/1
0.4 CAPSULE ORAL DAILY
Qty: 30 CAPSULE | Refills: 0 | Status: SHIPPED | OUTPATIENT
Start: 2021-03-10 | End: 2021-03-10

## 2021-03-10 RX ORDER — SULFAMETHOXAZOLE AND TRIMETHOPRIM 800; 160 MG/1; MG/1
1 TABLET ORAL 2 TIMES DAILY
Qty: 10 TABLET | Refills: 0 | Status: SHIPPED | OUTPATIENT
Start: 2021-03-10 | End: 2021-03-10

## 2021-03-10 RX ORDER — PHENAZOPYRIDINE HYDROCHLORIDE 200 MG/1
200 TABLET, FILM COATED ORAL 3 TIMES DAILY PRN
Qty: 9 TABLET | Refills: 0 | Status: SHIPPED | OUTPATIENT
Start: 2021-03-10 | End: 2021-03-13

## 2021-03-10 RX ORDER — PHENAZOPYRIDINE HYDROCHLORIDE 200 MG/1
200 TABLET, FILM COATED ORAL 3 TIMES DAILY PRN
Qty: 9 TABLET | Refills: 0 | Status: SHIPPED | OUTPATIENT
Start: 2021-03-10 | End: 2021-03-10

## 2021-03-10 RX ORDER — SULFAMETHOXAZOLE AND TRIMETHOPRIM 800; 160 MG/1; MG/1
1 TABLET ORAL 2 TIMES DAILY
Qty: 10 TABLET | Refills: 0 | Status: SHIPPED | OUTPATIENT
Start: 2021-03-10 | End: 2021-03-15

## 2021-03-10 RX ORDER — TAMSULOSIN HYDROCHLORIDE 0.4 MG/1
0.4 CAPSULE ORAL DAILY
Qty: 30 CAPSULE | Refills: 0 | Status: SHIPPED | OUTPATIENT
Start: 2021-03-10 | End: 2021-04-09

## 2021-03-10 NOTE — OPERATIVE REPORT
[facility]   Urology Operative Note     Monique Que Location: OR   [facility] [id number] MRN [id number]   Admission Date 3/9/2021 Operation Date 3/9/2021   Service Urology Surgeon Mark Herrera MD      Primary Surgeon: Mark Herrera MD inserted. Description of Procedure: The patient was brought to the operating room and identified by their wristband including their name, medical record number, and date of birth. They were transferred to the operating room table.  Appropriate monitoring bladder was drained and the cystoscope was removed. 20 mL of 2% Urojet was injected per urethra and held for 2 minutes for postoperative analgesia. This concluded our procedure.   Patient was repositioned supine general anesthesia was reversed and he was

## 2021-03-10 NOTE — PROGRESS NOTES
[facility]  Urology Consult Follow-Up Note    Becky SMITH [facility] Patient Status:  Inpatient    1960 MRN [id number]   Location [facility] 5SW/SE Attending James Lawrence MD   Hosp Day # 1 PCP Sue Reynaga MD (CPT=71045)    Result Date: 3/9/2021  CONCLUSION:  1. Stable findings from January 8, 2020. 2. Atherosclerosis. 3. Demineralization. 4. Osteoarthritis. 5. Mild bowel distention.  6. A preliminary report was submitted and there is agreement without major dis

## 2021-03-10 NOTE — ANESTHESIA POSTPROCEDURE EVALUATION
Patient: Jeremy Davis    Procedure Summary     Date: 03/09/21 Room / Location: [address] [place] [address] OR 14 / [address] [place] [address] OR    Anesthesia Start: 0102 Anesthesia Stop: 4244    Procedure: CYSTOSCOPY, RIGHT RETROGRADE PYELOGRAM, RIGHT URETEROSCOPY, RIGHT URETERAL STENT

## 2021-03-10 NOTE — DISCHARGE SUMMARY
[facility]  Discharge Summary     Angy Valdez  : 1960    Status: Inpatient  Day #: 1    Attending: Aida Arrington MD  PCP: Yumiko Zhao MD     Date of Admission:  3/9/2021  Date of Discharge:  3/10/2021     [facility] Discharge Nitin De La O RIGHT URETERAL STENT INSERTION Samara Klinefelter,  [facility] MAIN OR Comp         Physical Exam   Blood pressure 146/79, pulse 77, temperature 97.8 °F (36.6 °C), temperature source Oral, resp. rate 15, height 6' (1.829 m), weight 185 lb (83.9 kg), SpO2 97 %.   G Quantity: 90 tablet  Refills: 3     nicotine 21 MG/24HR Pt24  Commonly known as: Nicoderm CQ      Place 1 patch onto the skin daily.    Quantity: 30 patch  Refills: 1     simvastatin 10 MG Tabs  Commonly known as: ZOCOR      TAKE 1 TABLET BY MOUTH NIGHTLY

## 2021-03-10 NOTE — PAYOR COMM NOTE
--------------  ADMISSION REVIEW     Payor: Maria Ines Monk  #:  [id number]  Authorization Number: [id number]    Admit date: 3/9/21  Admit time:  6:21 AM       Admitting Physician: Mark Duque MD  Attending Physician:  Hallie Villela MD Angie Barth   LISINOPRIL-HYDROCHLOROTHIAZIDE 20-25 MG Oral Tab, TAKE 1 TABLET BY MOUTH ONE TIME DAILY, Disp: 90 tablet, Rfl: 3, 3/8/2021  CLOPIDOGREL BISULFATE 75 MG Oral Tab, TAKE 1 TABLET BY MOUTH DAILY, Disp: 90 tablet, Rfl: 3, 3/8/2021 at Unknown time  [facility] the Last Year:       Ran Out of Food in the Last Year:   Transportation Needs:       Lack of Transportation (Medical):       Lack of Transportation (Non-Medical):   Physical Activity:       Days of Exercise per Week:       Minutes of Exercise per Session: normal.   Eyes:      Conjunctiva/sclera: Conjunctivae normal.      Pupils: Pupils are equal, round, and reactive to light. Cardiovascular:      Rate and Rhythm: Normal rate and regular rhythm. Heart sounds: Normal heart sounds.    Pulmonary:      Eff tests on the individual orders. URINALYSIS WITH CULTURE REFLEX   URINE CULTURE, ROUTINE   URINE CULTURE, ROUTINE         Imaging Results Available and Reviewed while in ED: XR ABDOMEN (1 VIEW) (CPT=74018)    Result Date: 3/9/2021  CONCLUSION:  1.  Right r (ROCEPHIN) 1 g in sodium chloride 0.9% 100 mL IVPB-ADDV (has no administration in time range)   tamsulosin HCl (FLOMAX) cap 0.4 mg (0.4 mg Oral Given 3/9/21 2059)   ketorolac tromethamine (TORADOL) 30 MG/ML injection 30 mg ( Intravenous MAR Unhold 3/9/21 2 Course: 79-year-old male presents the ER with complaint of right flank pain. Patient will be admitted with urology on consult for intractable flank pain secondary to an obstructed ureteral stone.     Condition upon leaving the department: Stable    Total C Present Illness:  James Butts is a(n) 63year old male,   with a past medical history significant for urolithiasis over 10 years ago and CVA presents with a complaint of right flank pain that started 2 days ago.   He was seen in the ER yesterday rece Sig: TAKE 1 TABLET BY MOUTH DAILY   LISINOPRIL-HYDROCHLOROTHIAZIDE 20-25 MG Oral Tab 3/8/2021  No Yes   Sig: TAKE 1 TABLET BY MOUTH ONE TIME DAILY   SIMVASTATIN 10 MG Oral Tab 3/8/2021  No Yes   Sig: TAKE 1 TABLET BY MOUTH NIGHTLY   acetaminophen 325 MG non-distended, normal bowel sounds, no organomegaly. Lymphatics:  No lymphadenopathy neck, axilla, groin. Musculoskeletal: Normal range of motion. normal strength. Feet:  Normal pulses.   Neurologic:  Alert, oriented, no focal deficits, cranial nerves I 3/9/2021  CONCLUSION:  1. Stable findings from January 8, 2020. 2. Atherosclerosis. 3. Demineralization. 4. Osteoarthritis. 5. Mild bowel distention. 6. A preliminary report was submitted and there is agreement without major discrepancies.       Dictated by (HYDRODIURIL) 25 mg for Zesteretic 20-25 ([facility] only)     Date Action Dose Route User    3/10/2021 9000 Given (none) Oral Yokasta Patel      fentaNYL citrate (SUBLIMAZE) 0.05 MG/ML injection     Date Action Dose Route User    3/9/2021 1751 Given 100 mcg Intr Marifer Hickman RN          3/9 UROLOGY CONSULT NOTE  Date of Consult: 03/09/21   Reason for Consultation:   Second visit to ER with symptomatic 5 mm right obstructing ureteral calculus with resultant mild hydronephrosis.     History of Present Illness medications.       Last PSA was in October 2018 at 1.3 ng/ml.     Patient is on Plavix 75 mg PO daily and Asprin 81 mg PO daily for history of CVA in 2010.   He is also s/p carotid endarterectomy with Dr. Jesse Kwon in January 2020.      He denies an additiona ORAL)(CPT=74176)     Result Date: 3/8/2021  CONCLUSION:  1. Mild asymmetric right hydronephrosis related to an obstructing 4-5 mm calculus at the right ureteropelvic junction.  2. Nonspecific bilateral perinephric stranding, which can be seen in the setting stone. Also options of placing ureteral stent to help relieve obstruction and improve pain.   Discussed definitive stone management would be deferred at this time due to possibility of infection given leukocytosis as well as his last dose of aspirin and Pl ASCERTA STENT URET 78PE 6FR - SNA   ASCERTA STENT URET 53KU 6FR NA 19 [place] [id number] Right 1 Implanted         Drains: 6 Lebanese by 26 cm right ureteral JJ stent.     Patient's Condition at the End of the Procedure: Extubated and stable t standpoint. Will provide prescriptions for Bactrim DS p.o. twice daily for 5 days, Flomax daily for stent related discomfort, and Pyridium as needed for dysuria.     2.  Patient will need definitive stone management in about 2 weeks.  We will contact him t pain unimproved with pain medication he has been taking. Complains of associated nausea denies any emesis. Denies any fever or chills. Denies any dysuria or hematuria.       Hospital Course     Ureterolithiasis with mild hydronephrosis  Leukocytosis  -ur PYRIDIUM      Take 1 tablet (200 mg total) by mouth 3 (three) times daily as needed for Pain.    Stop taking on: March 13, 2021  Quantity: 9 tablet  Refills: 0     Sulfamethoxazole-TMP -160 MG Tabs per tablet  Commonly known as: BACTRIM DS      Take 1 Specialty: Internal Medicine  Contact information:  [address] [facility]  [phone]             Diana Raman MD.    Specialty: UROLOGY  Contact information:  [address]  [facility]  [phone]

## 2021-03-10 NOTE — PROGRESS NOTES
This RN received phone call from pt's wife in regards to discharge medications sent to wrong pharmacy. This RN contacted [facility] to help switch medications to correct pharmacy for patient.

## 2021-03-11 ENCOUNTER — TELEPHONE (OUTPATIENT)
Dept: INTERNAL MEDICINE UNIT | Facility: HOSPITAL | Age: 61
End: 2021-03-11

## 2021-03-11 ENCOUNTER — PATIENT OUTREACH (OUTPATIENT)
Dept: CASE MANAGEMENT | Age: 61
End: 2021-03-11

## 2021-03-11 DIAGNOSIS — Z02.9 ENCOUNTERS FOR UNSPECIFIED ADMINISTRATIVE PURPOSE: ICD-10-CM

## 2021-03-11 NOTE — TELEPHONE ENCOUNTER
VM received at hospitalist office stating medications had to be switched to another pharmacy. Call made to patient. Pt states issue was resolved no further assistance required at this moment.

## 2021-03-12 ENCOUNTER — TELEPHONE (OUTPATIENT)
Dept: INTERNAL MEDICINE CLINIC | Facility: CLINIC | Age: 61
End: 2021-03-12

## 2021-03-12 NOTE — PROGRESS NOTES
LMTCB for post hospital follow up. NCM contact information provided. [facility] sent TE to PCP office re: assistance in scheduling TCM HFU appt.

## 2021-03-12 NOTE — TELEPHONE ENCOUNTER
Pt discharged 3-10-21, ureteral stone with hydronephrosis. Pt does not have HFU appt scheduled at this time. NCM called twice and left 2 messages. TCM/HFU appt recommended by 3-17-21 as pt is a high risk for readmission.   Please discuss with PCP and co

## 2021-03-15 ENCOUNTER — OFFICE VISIT (OUTPATIENT)
Dept: INTERNAL MEDICINE CLINIC | Facility: CLINIC | Age: 61
End: 2021-03-15
Payer: COMMERCIAL

## 2021-03-15 ENCOUNTER — LAB ENCOUNTER (OUTPATIENT)
Dept: LAB | Age: 61
End: 2021-03-15
Attending: UROLOGY
Payer: COMMERCIAL

## 2021-03-15 ENCOUNTER — TELEPHONE (OUTPATIENT)
Dept: SURGERY | Facility: CLINIC | Age: 61
End: 2021-03-15

## 2021-03-15 VITALS
WEIGHT: 186 LBS | DIASTOLIC BLOOD PRESSURE: 67 MMHG | HEART RATE: 87 BPM | SYSTOLIC BLOOD PRESSURE: 145 MMHG | BODY MASS INDEX: 25.19 KG/M2 | HEIGHT: 72 IN

## 2021-03-15 DIAGNOSIS — I10 ESSENTIAL HYPERTENSION: Primary | ICD-10-CM

## 2021-03-15 DIAGNOSIS — Z01.818 PREOP EXAMINATION: ICD-10-CM

## 2021-03-15 DIAGNOSIS — R39.89 OTHER SYMPTOMS AND SIGNS INVOLVING THE GENITOURINARY SYSTEM: Primary | ICD-10-CM

## 2021-03-15 DIAGNOSIS — I65.23 BILATERAL CAROTID ARTERY STENOSIS: ICD-10-CM

## 2021-03-15 DIAGNOSIS — N13.2 URETERAL STONE WITH HYDRONEPHROSIS: ICD-10-CM

## 2021-03-15 DIAGNOSIS — R39.89 OTHER SYMPTOMS AND SIGNS INVOLVING THE GENITOURINARY SYSTEM: ICD-10-CM

## 2021-03-15 DIAGNOSIS — N20.1 RIGHT URETERAL STONE: ICD-10-CM

## 2021-03-15 LAB
BILIRUB UR QL: NEGATIVE
CLARITY UR: CLEAR
COLOR UR: YELLOW
GLUCOSE UR-MCNC: NEGATIVE MG/DL
KETONES UR-MCNC: NEGATIVE MG/DL
LEUKOCYTE ESTERASE UR QL STRIP.AUTO: NEGATIVE
NITRITE UR QL STRIP.AUTO: NEGATIVE
PH UR: 6 [PH] (ref 5–8)
PROT UR-MCNC: NEGATIVE MG/DL
SP GR UR STRIP: 1.01 (ref 1–1.03)
UROBILINOGEN UR STRIP-ACNC: <2

## 2021-03-15 PROCEDURE — 3078F DIAST BP <80 MM HG: CPT | Performed by: INTERNAL MEDICINE

## 2021-03-15 PROCEDURE — 3077F SYST BP >= 140 MM HG: CPT | Performed by: INTERNAL MEDICINE

## 2021-03-15 PROCEDURE — 93005 ELECTROCARDIOGRAM TRACING: CPT

## 2021-03-15 PROCEDURE — 81001 URINALYSIS AUTO W/SCOPE: CPT

## 2021-03-15 PROCEDURE — 99495 TRANSJ CARE MGMT MOD F2F 14D: CPT | Performed by: INTERNAL MEDICINE

## 2021-03-15 PROCEDURE — 93010 ELECTROCARDIOGRAM REPORT: CPT | Performed by: UROLOGY

## 2021-03-15 PROCEDURE — 87086 URINE CULTURE/COLONY COUNT: CPT | Performed by: UROLOGY

## 2021-03-15 PROCEDURE — 3008F BODY MASS INDEX DOCD: CPT | Performed by: INTERNAL MEDICINE

## 2021-03-15 NOTE — TELEPHONE ENCOUNTER
Dear Shaan Cornell, patient is scheduled for Cystoscopy, right retrograde pyelogram, right ureteroscopy, laser lithotripsy, stone extraction, stent exchange, Friday 03/26/2021.   Karl Colvin is requesting patient hold plavix/aspirin 5 days prior to scheduled s

## 2021-03-15 NOTE — TELEPHONE ENCOUNTER
Called patient scheduled Cystoscopy, right retrograde pyelogram, right ureteroscopy, laser lithotripsy, stone extraction, stent exchange, Friday 03/26/2021, [facility]/outpatient, went over pre-op/lab instructions, sent via my chart, all questions p

## 2021-03-15 NOTE — PROGRESS NOTES
HPI:    Shady Will is a 63year old male here today for hospital follow up.    He was discharged from Inpatient hospital, [facility]  to Home   Admission Date: 3/9/21   Discharge Date: 3/10/21  Hospital Discharge Diagnoses (since 2/13/2021) for Cystoscopy, right retrograde pyelogram, right ureteroscopy, laser lithotripsy, stone extraction, stent exchange, Friday 03/26/2021.      HISTORY   History  11/4/2019 first visit  new pt   C/c htn   [place] and now workin 03/09/2021). He family history includes Cancer (age of onset: 47) in his sister; Colon Cancer (age of onset: 67) in his mother; Diabetes in his mother. He  reports that he has been smoking cigarettes. He has a 22.50 pack-year smoking history.  He has n follow a low salt , low sodium (including fast foods and processed foods), can look up DASH diet, exercise 30 min a day , monitor bp     Ureteral stone with hydronephrosis  scheduled for Cystoscopy, right retrograde pyelogram, right ureteroscopy, laser lit

## 2021-03-24 ENCOUNTER — LAB ENCOUNTER (OUTPATIENT)
Dept: LAB | Facility: HOSPITAL | Age: 61
End: 2021-03-24
Attending: UROLOGY
Payer: COMMERCIAL

## 2021-03-24 DIAGNOSIS — Z01.818 PREOP TESTING: ICD-10-CM

## 2021-03-24 RX ORDER — SODIUM CHLORIDE, SODIUM LACTATE, POTASSIUM CHLORIDE, CALCIUM CHLORIDE 600; 310; 30; 20 MG/100ML; MG/100ML; MG/100ML; MG/100ML
INJECTION, SOLUTION INTRAVENOUS CONTINUOUS
Status: CANCELLED | OUTPATIENT
Start: 2021-03-24

## 2021-03-24 NOTE — PROGRESS NOTES
Multiple attempts to reach the pt and messages left with no returned phone call. Pt went to HFU with PCP on 3/15/21, closing encounter.

## 2021-03-25 LAB — SARS-COV-2 RNA RESP QL NAA+PROBE: NOT DETECTED

## 2021-03-26 ENCOUNTER — ANESTHESIA (OUTPATIENT)
Dept: SURGERY | Facility: HOSPITAL | Age: 61
End: 2021-03-26
Payer: COMMERCIAL

## 2021-03-26 ENCOUNTER — HOSPITAL ENCOUNTER (OUTPATIENT)
Facility: HOSPITAL | Age: 61
Setting detail: HOSPITAL OUTPATIENT SURGERY
Discharge: HOME | End: 2021-03-26
Attending: UROLOGY | Admitting: UROLOGY
Payer: COMMERCIAL

## 2021-03-26 ENCOUNTER — APPOINTMENT (OUTPATIENT)
Dept: GENERAL RADIOLOGY | Facility: HOSPITAL | Age: 61
End: 2021-03-26
Attending: UROLOGY
Payer: COMMERCIAL

## 2021-03-26 ENCOUNTER — ANESTHESIA EVENT (OUTPATIENT)
Dept: SURGERY | Facility: HOSPITAL | Age: 61
End: 2021-03-26
Payer: COMMERCIAL

## 2021-03-26 VITALS
HEIGHT: 72 IN | OXYGEN SATURATION: 100 % | DIASTOLIC BLOOD PRESSURE: 78 MMHG | SYSTOLIC BLOOD PRESSURE: 148 MMHG | HEART RATE: 60 BPM | TEMPERATURE: 98 F | WEIGHT: 180 LBS | RESPIRATION RATE: 18 BRPM | BODY MASS INDEX: 24.38 KG/M2

## 2021-03-26 DIAGNOSIS — Z01.818 PREOP TESTING: Primary | ICD-10-CM

## 2021-03-26 DIAGNOSIS — N20.1 RIGHT URETERAL STONE: ICD-10-CM

## 2021-03-26 DIAGNOSIS — Z01.818 PRE-OP TESTING: ICD-10-CM

## 2021-03-26 PROCEDURE — 52332 CYSTOSCOPY AND TREATMENT: CPT | Performed by: UROLOGY

## 2021-03-26 PROCEDURE — 74420 UROGRAPHY RTRGR +-KUB: CPT | Performed by: UROLOGY

## 2021-03-26 PROCEDURE — 52352 CYSTOURETERO W/STONE REMOVE: CPT | Performed by: UROLOGY

## 2021-03-26 PROCEDURE — 0T768DZ DILATION OF RIGHT URETER WITH INTRALUMINAL DEVICE, VIA NATURAL OR ARTIFICIAL OPENING ENDOSCOPIC: ICD-10-PCS | Performed by: UROLOGY

## 2021-03-26 PROCEDURE — 0TC68ZZ EXTIRPATION OF MATTER FROM RIGHT URETER, VIA NATURAL OR ARTIFICIAL OPENING ENDOSCOPIC: ICD-10-PCS | Performed by: UROLOGY

## 2021-03-26 DEVICE — URETERAL STENT
Type: IMPLANTABLE DEVICE | Site: URETER | Status: FUNCTIONAL
Brand: ASCERTA™

## 2021-03-26 RX ORDER — HYDROMORPHONE HYDROCHLORIDE 1 MG/ML
0.2 INJECTION, SOLUTION INTRAMUSCULAR; INTRAVENOUS; SUBCUTANEOUS EVERY 5 MIN PRN
Status: DISCONTINUED | OUTPATIENT
Start: 2021-03-26 | End: 2021-03-26

## 2021-03-26 RX ORDER — MORPHINE SULFATE 10 MG/ML
6 INJECTION, SOLUTION INTRAMUSCULAR; INTRAVENOUS EVERY 10 MIN PRN
Status: DISCONTINUED | OUTPATIENT
Start: 2021-03-26 | End: 2021-03-26

## 2021-03-26 RX ORDER — GLYCOPYRROLATE 0.2 MG/ML
INJECTION, SOLUTION INTRAMUSCULAR; INTRAVENOUS AS NEEDED
Status: DISCONTINUED | OUTPATIENT
Start: 2021-03-26 | End: 2021-03-26 | Stop reason: SURG

## 2021-03-26 RX ORDER — EPHEDRINE SULFATE 50 MG/ML
INJECTION, SOLUTION INTRAVENOUS AS NEEDED
Status: DISCONTINUED | OUTPATIENT
Start: 2021-03-26 | End: 2021-03-26 | Stop reason: SURG

## 2021-03-26 RX ORDER — HYDROCODONE BITARTRATE AND ACETAMINOPHEN 5; 325 MG/1; MG/1
2 TABLET ORAL EVERY 4 HOURS PRN
Status: DISCONTINUED | OUTPATIENT
Start: 2021-03-26 | End: 2021-03-26

## 2021-03-26 RX ORDER — LIDOCAINE HYDROCHLORIDE 10 MG/ML
INJECTION, SOLUTION EPIDURAL; INFILTRATION; INTRACAUDAL; PERINEURAL AS NEEDED
Status: DISCONTINUED | OUTPATIENT
Start: 2021-03-26 | End: 2021-03-26 | Stop reason: SURG

## 2021-03-26 RX ORDER — ALBUTEROL SULFATE 2.5 MG/3ML
2.5 SOLUTION RESPIRATORY (INHALATION)
Status: DISCONTINUED | OUTPATIENT
Start: 2021-03-26 | End: 2021-03-26

## 2021-03-26 RX ORDER — DEXTROSE MONOHYDRATE 25 G/50ML
50 INJECTION, SOLUTION INTRAVENOUS
Status: DISCONTINUED | OUTPATIENT
Start: 2021-03-26 | End: 2021-03-26

## 2021-03-26 RX ORDER — PHENAZOPYRIDINE HYDROCHLORIDE 200 MG/1
200 TABLET, FILM COATED ORAL ONCE AS NEEDED
Status: DISCONTINUED | OUTPATIENT
Start: 2021-03-26 | End: 2021-03-26

## 2021-03-26 RX ORDER — NEOSTIGMINE METHYLSULFATE 1 MG/ML
INJECTION INTRAVENOUS AS NEEDED
Status: DISCONTINUED | OUTPATIENT
Start: 2021-03-26 | End: 2021-03-26 | Stop reason: SURG

## 2021-03-26 RX ORDER — MORPHINE SULFATE 4 MG/ML
4 INJECTION, SOLUTION INTRAMUSCULAR; INTRAVENOUS EVERY 10 MIN PRN
Status: DISCONTINUED | OUTPATIENT
Start: 2021-03-26 | End: 2021-03-26

## 2021-03-26 RX ORDER — SULFAMETHOXAZOLE AND TRIMETHOPRIM 800; 160 MG/1; MG/1
1 TABLET ORAL 2 TIMES DAILY
Qty: 10 TABLET | Refills: 0 | Status: SHIPPED | OUTPATIENT
Start: 2021-03-26 | End: 2021-03-31

## 2021-03-26 RX ORDER — HYDROMORPHONE HYDROCHLORIDE 1 MG/ML
0.4 INJECTION, SOLUTION INTRAMUSCULAR; INTRAVENOUS; SUBCUTANEOUS EVERY 5 MIN PRN
Status: DISCONTINUED | OUTPATIENT
Start: 2021-03-26 | End: 2021-03-26

## 2021-03-26 RX ORDER — HALOPERIDOL 5 MG/ML
0.25 INJECTION INTRAMUSCULAR ONCE AS NEEDED
Status: DISCONTINUED | OUTPATIENT
Start: 2021-03-26 | End: 2021-03-26

## 2021-03-26 RX ORDER — ROCURONIUM BROMIDE 10 MG/ML
INJECTION, SOLUTION INTRAVENOUS AS NEEDED
Status: DISCONTINUED | OUTPATIENT
Start: 2021-03-26 | End: 2021-03-26 | Stop reason: SURG

## 2021-03-26 RX ORDER — HYDROCODONE BITARTRATE AND ACETAMINOPHEN 5; 325 MG/1; MG/1
2 TABLET ORAL AS NEEDED
Status: DISCONTINUED | OUTPATIENT
Start: 2021-03-26 | End: 2021-03-26

## 2021-03-26 RX ORDER — PHENAZOPYRIDINE HYDROCHLORIDE 100 MG/1
100 TABLET, FILM COATED ORAL 3 TIMES DAILY PRN
Qty: 9 TABLET | Refills: 0 | Status: SHIPPED | OUTPATIENT
Start: 2021-03-26 | End: 2021-09-27 | Stop reason: ALTCHOICE

## 2021-03-26 RX ORDER — ACETAMINOPHEN 325 MG/1
650 TABLET ORAL EVERY 4 HOURS PRN
Status: DISCONTINUED | OUTPATIENT
Start: 2021-03-26 | End: 2021-03-26

## 2021-03-26 RX ORDER — NALOXONE HYDROCHLORIDE 0.4 MG/ML
80 INJECTION, SOLUTION INTRAMUSCULAR; INTRAVENOUS; SUBCUTANEOUS AS NEEDED
Status: DISCONTINUED | OUTPATIENT
Start: 2021-03-26 | End: 2021-03-26

## 2021-03-26 RX ORDER — SODIUM CHLORIDE, SODIUM LACTATE, POTASSIUM CHLORIDE, CALCIUM CHLORIDE 600; 310; 30; 20 MG/100ML; MG/100ML; MG/100ML; MG/100ML
INJECTION, SOLUTION INTRAVENOUS CONTINUOUS PRN
Status: DISCONTINUED | OUTPATIENT
Start: 2021-03-26 | End: 2021-03-26 | Stop reason: SURG

## 2021-03-26 RX ORDER — HYDROMORPHONE HYDROCHLORIDE 1 MG/ML
0.6 INJECTION, SOLUTION INTRAMUSCULAR; INTRAVENOUS; SUBCUTANEOUS EVERY 5 MIN PRN
Status: DISCONTINUED | OUTPATIENT
Start: 2021-03-26 | End: 2021-03-26

## 2021-03-26 RX ORDER — ONDANSETRON 2 MG/ML
4 INJECTION INTRAMUSCULAR; INTRAVENOUS ONCE AS NEEDED
Status: DISCONTINUED | OUTPATIENT
Start: 2021-03-26 | End: 2021-03-26

## 2021-03-26 RX ORDER — LIDOCAINE HYDROCHLORIDE 20 MG/ML
JELLY TOPICAL AS NEEDED
Status: DISCONTINUED | OUTPATIENT
Start: 2021-03-26 | End: 2021-03-26 | Stop reason: HOSPADM

## 2021-03-26 RX ORDER — CEFTRIAXONE 1 G/1
INJECTION, POWDER, FOR SOLUTION INTRAMUSCULAR; INTRAVENOUS AS NEEDED
Status: DISCONTINUED | OUTPATIENT
Start: 2021-03-26 | End: 2021-03-26 | Stop reason: SURG

## 2021-03-26 RX ORDER — ACETAMINOPHEN 500 MG
1000 TABLET ORAL ONCE
Status: COMPLETED | OUTPATIENT
Start: 2021-03-26 | End: 2021-03-26

## 2021-03-26 RX ORDER — MORPHINE SULFATE 4 MG/ML
2 INJECTION, SOLUTION INTRAMUSCULAR; INTRAVENOUS EVERY 10 MIN PRN
Status: DISCONTINUED | OUTPATIENT
Start: 2021-03-26 | End: 2021-03-26

## 2021-03-26 RX ORDER — SODIUM CHLORIDE, SODIUM LACTATE, POTASSIUM CHLORIDE, CALCIUM CHLORIDE 600; 310; 30; 20 MG/100ML; MG/100ML; MG/100ML; MG/100ML
INJECTION, SOLUTION INTRAVENOUS CONTINUOUS
Status: DISCONTINUED | OUTPATIENT
Start: 2021-03-26 | End: 2021-03-26

## 2021-03-26 RX ORDER — HYDROCODONE BITARTRATE AND ACETAMINOPHEN 5; 325 MG/1; MG/1
1 TABLET ORAL EVERY 4 HOURS PRN
Status: DISCONTINUED | OUTPATIENT
Start: 2021-03-26 | End: 2021-03-26

## 2021-03-26 RX ORDER — SODIUM CHLORIDE 9 MG/ML
INJECTION, SOLUTION INTRAVENOUS CONTINUOUS
Status: DISCONTINUED | OUTPATIENT
Start: 2021-03-26 | End: 2021-03-26

## 2021-03-26 RX ORDER — HYDROCODONE BITARTRATE AND ACETAMINOPHEN 5; 325 MG/1; MG/1
1 TABLET ORAL AS NEEDED
Status: DISCONTINUED | OUTPATIENT
Start: 2021-03-26 | End: 2021-03-26

## 2021-03-26 RX ADMIN — GLYCOPYRROLATE 0.6 MG: 0.2 INJECTION, SOLUTION INTRAMUSCULAR; INTRAVENOUS at 10:06:00

## 2021-03-26 RX ADMIN — EPHEDRINE SULFATE 10 MG: 50 INJECTION, SOLUTION INTRAVENOUS at 09:37:00

## 2021-03-26 RX ADMIN — NEOSTIGMINE METHYLSULFATE 5 MG: 1 INJECTION INTRAVENOUS at 10:06:00

## 2021-03-26 RX ADMIN — LIDOCAINE HYDROCHLORIDE 50 MG: 10 INJECTION, SOLUTION EPIDURAL; INFILTRATION; INTRACAUDAL; PERINEURAL at 09:24:00

## 2021-03-26 RX ADMIN — SODIUM CHLORIDE, SODIUM LACTATE, POTASSIUM CHLORIDE, CALCIUM CHLORIDE: 600; 310; 30; 20 INJECTION, SOLUTION INTRAVENOUS at 09:21:00

## 2021-03-26 RX ADMIN — ROCURONIUM BROMIDE 30 MG: 10 INJECTION, SOLUTION INTRAVENOUS at 09:24:00

## 2021-03-26 RX ADMIN — CEFTRIAXONE 1 G: 1 INJECTION, POWDER, FOR SOLUTION INTRAMUSCULAR; INTRAVENOUS at 09:59:00

## 2021-03-26 RX ADMIN — SODIUM CHLORIDE, SODIUM LACTATE, POTASSIUM CHLORIDE, CALCIUM CHLORIDE: 600; 310; 30; 20 INJECTION, SOLUTION INTRAVENOUS at 10:06:00

## 2021-03-26 NOTE — H&P
History & Physical Examination    Patient Name: Ivette Sauceda  MRN: [id number]  [facility]: [id number]  YOB: 1960    Diagnosis: Right ureteral stone. Present Illness: 79-year-old male with previous history of nephrolithiasis.   Admitted to 03/09/2021    Dr. Earl Menon @ [address] [place] [address]   • MUSCULOSKELETAL SURGERY UNLISTED Right 2008    Right shoulder surgery     Family History   Problem Relation Age of Onset   • No Known Problems Father    • Colon Cancer Mother 67   • Diabetes Mother    • Cancer Sister 47

## 2021-03-26 NOTE — ANESTHESIA POSTPROCEDURE EVALUATION
Patient: John Paul Kohler    Procedure Summary     Date: 03/26/21 Room / Location: [facility] OR  / [facility] OR    Anesthesia Start: 8799 Anesthesia Stop: 0189    Procedure: Cystoscopy, right retrograde pyelogram, right ureteroscopy, stone extraction, ri

## 2021-03-26 NOTE — OPERATIVE REPORT
[facility]   Urology Operative Note     Trace SMITH COM [facility] Location: OR   [facility] [id number] MRN [id number]   Admission Date 3/26/2021 Operation Date 3/26/2021   Service Urology Surgeon Beny Bates MD      Primary Surgeon: Beny Bates, IV antibiotics were administered for surgical prophylaxis. The patient was then positioned in dorsal lithotomy with their legs in [address] [place] [address] and all pressure areas and bony prominences padded appropriately.  The surgical site was prepped and draped in s introduced and the stone was engaged using the basket. The stone was then extracted intact under direct visual guidance by pulling the ureteroscope and basket. No resistance was met. The stone was sent for chemical analysis.     The safety wire was backlo voiding. Patient will be provided with instructions to remove the stent placement on a string on Monday, 3/29/2021. Electronic prescriptions for Pyridium as needed for dysuria and Bactrim DS p.o. twice daily for 5 days.   Patient may resume oral anticoagu

## 2021-03-26 NOTE — ANESTHESIA PREPROCEDURE EVALUATION
Anesthesia PreOp Note    HPI:     Vu Resendiz is a 63year old male who presents for preoperative consultation requested by: Riley Rodriguez MD    Date of Surgery: 3/26/2021    Procedure(s):  Cystoscopy, right retrograde pyelogram, right ureterosc screening 04-    Per [facility]   • Muscle weakness     right sided weakness after CVA   • Stroke [facility]) 2012    right side weakness   • Visual impairment        Past Surgical History:   Procedure Laterality Date   • ARTHROSCOPY OF JOINT UNLISTED Right Allergies    Family History   Problem Relation Age of Onset   • No Known Problems Father    • Colon Cancer Mother 67   • Diabetes Mother    • Cancer Sister 47        Cancer - lymphoma     Social History    Socioeconomic History      Marital status:      Feeling of Stress :   Social Connections:       Frequency of Communication with Friends and Family:       Frequency of Social Gatherings with Friends and Family:       Attends Church Services:       Active Member of Clubs or Organizations:       A (+)  CVA (Right arm and leg weakness and numbness) residual symptoms, anxiety/panic attacks,  depression,       GI/Hepatic/Renal - negative ROS     Endo/Other - negative ROS   Abdominal  - normal exam               Anesthesia Plan:   ASA:  3  Plan:   Gener

## 2021-03-26 NOTE — ANESTHESIA PROCEDURE NOTES
Airway  Date/Time: 3/26/2021 9:31 AM  Urgency: Elective    Airway not difficult    General Information and Staff    Patient location during procedure: OR  Anesthesiologist: Zamzam Carr MD  Resident/CRNA: Medhat Magaña CRNA  Performed: LUDY Vang

## 2021-03-29 LAB
CALCULI MASS: 37 MG
CALCULI NUMBER: 1

## 2021-08-27 DIAGNOSIS — I10 ESSENTIAL HYPERTENSION: ICD-10-CM

## 2021-08-27 DIAGNOSIS — I63.9 CEREBROVASCULAR ACCIDENT (CVA), UNSPECIFIED MECHANISM (HCC): ICD-10-CM

## 2021-08-30 RX ORDER — SIMVASTATIN 10 MG
TABLET ORAL
Qty: 90 TABLET | Refills: 3 | Status: SHIPPED | OUTPATIENT
Start: 2021-08-30

## 2021-08-30 RX ORDER — LISINOPRIL AND HYDROCHLOROTHIAZIDE 25; 20 MG/1; MG/1
TABLET ORAL
Qty: 90 TABLET | Refills: 3 | Status: SHIPPED | OUTPATIENT
Start: 2021-08-30

## 2021-08-30 RX ORDER — CLOPIDOGREL BISULFATE 75 MG/1
TABLET ORAL
Qty: 90 TABLET | Refills: 3 | Status: SHIPPED | OUTPATIENT
Start: 2021-08-30

## 2021-09-27 ENCOUNTER — OFFICE VISIT (OUTPATIENT)
Dept: INTERNAL MEDICINE CLINIC | Facility: CLINIC | Age: 61
End: 2021-09-27
Payer: COMMERCIAL

## 2021-09-27 VITALS
BODY MASS INDEX: 24.79 KG/M2 | RESPIRATION RATE: 16 BRPM | SYSTOLIC BLOOD PRESSURE: 128 MMHG | WEIGHT: 183 LBS | DIASTOLIC BLOOD PRESSURE: 77 MMHG | HEART RATE: 69 BPM | HEIGHT: 72 IN

## 2021-09-27 DIAGNOSIS — Z12.11 COLON CANCER SCREENING: ICD-10-CM

## 2021-09-27 DIAGNOSIS — Z00.00 PHYSICAL EXAM, ANNUAL: Primary | ICD-10-CM

## 2021-09-27 DIAGNOSIS — Z12.5 ENCOUNTER FOR SCREENING FOR MALIGNANT NEOPLASM OF PROSTATE: ICD-10-CM

## 2021-09-27 PROCEDURE — 99396 PREV VISIT EST AGE 40-64: CPT | Performed by: INTERNAL MEDICINE

## 2021-09-27 PROCEDURE — 3008F BODY MASS INDEX DOCD: CPT | Performed by: INTERNAL MEDICINE

## 2021-09-27 PROCEDURE — 3078F DIAST BP <80 MM HG: CPT | Performed by: INTERNAL MEDICINE

## 2021-09-27 PROCEDURE — 3074F SYST BP LT 130 MM HG: CPT | Performed by: INTERNAL MEDICINE

## 2021-09-27 RX ORDER — ASPIRIN 81 MG/1
81 TABLET ORAL DAILY
COMMUNITY

## 2021-09-27 NOTE — PROGRESS NOTES
Reyna Ortiz is a 63year old male.   Patient presents with:  Physical      HPI:   Pt comes for his physical   C/c physical   C/o still gets dizziness once in a while since his stroke   Still salts food, drinks a \" a lot of coffee\"       HISTORY   H Colonoscopy  07-    Per [facility]   • Cystoscopy,insert ureteral stent Right 03/09/2021    Dr. Manny Holcomb @ [address] [place] [address]   • Cystouretro w/stone remove Right 03/26/2021    Dr. Manny Holcomb @ [address] [place] [address]   • Musculoskeletal surgery unlisted Right 2008    Right shoulder surge nontender  LUNGS: clear to auscultation, no wheeze  CARDIO: RRR without murmur  GI: good BS's,no masses or tenderness  EXTREMITIES: no cyanosis, or edema    ASSESSMENT AND PLAN:   Diagnoses and all orders for this visit:    Physical exam, annual  -     COM

## 2021-10-02 ENCOUNTER — LAB ENCOUNTER (OUTPATIENT)
Dept: LAB | Facility: HOSPITAL | Age: 61
End: 2021-10-02
Attending: INTERNAL MEDICINE
Payer: COMMERCIAL

## 2021-10-02 DIAGNOSIS — Z00.00 PHYSICAL EXAM, ANNUAL: ICD-10-CM

## 2021-10-02 DIAGNOSIS — Z12.5 ENCOUNTER FOR SCREENING FOR MALIGNANT NEOPLASM OF PROSTATE: ICD-10-CM

## 2021-10-02 PROCEDURE — 80061 LIPID PANEL: CPT

## 2021-10-02 PROCEDURE — 85027 COMPLETE CBC AUTOMATED: CPT

## 2021-10-02 PROCEDURE — 80053 COMPREHEN METABOLIC PANEL: CPT

## 2021-10-02 PROCEDURE — 84443 ASSAY THYROID STIM HORMONE: CPT

## 2021-10-02 PROCEDURE — 36415 COLL VENOUS BLD VENIPUNCTURE: CPT

## 2022-05-31 ENCOUNTER — HOSPITAL ENCOUNTER (EMERGENCY)
Facility: HOSPITAL | Age: 62
Discharge: HOME | End: 2022-05-31
Attending: EMERGENCY MEDICINE
Payer: COMMERCIAL

## 2022-05-31 ENCOUNTER — NURSE TRIAGE (OUTPATIENT)
Dept: INTERNAL MEDICINE CLINIC | Facility: CLINIC | Age: 62
End: 2022-05-31

## 2022-05-31 ENCOUNTER — TELEPHONE (OUTPATIENT)
Dept: CASE MANAGEMENT | Age: 62
End: 2022-05-31

## 2022-05-31 VITALS
RESPIRATION RATE: 18 BRPM | BODY MASS INDEX: 24.38 KG/M2 | DIASTOLIC BLOOD PRESSURE: 68 MMHG | WEIGHT: 180 LBS | SYSTOLIC BLOOD PRESSURE: 132 MMHG | OXYGEN SATURATION: 98 % | HEIGHT: 72 IN | TEMPERATURE: 98 F | HEART RATE: 89 BPM

## 2022-05-31 DIAGNOSIS — U07.1 COVID-19: Primary | ICD-10-CM

## 2022-05-31 LAB — SARS-COV-2 RNA RESP QL NAA+PROBE: DETECTED

## 2022-05-31 PROCEDURE — 99284 EMERGENCY DEPT VISIT MOD MDM: CPT

## 2022-05-31 RX ORDER — BEBTELOVIMAB 87.5 MG/ML
175 INJECTION, SOLUTION INTRAVENOUS ONCE
Status: COMPLETED | OUTPATIENT
Start: 2022-05-31 | End: 2022-05-31

## 2022-05-31 NOTE — TELEPHONE ENCOUNTER
Pt received MAB infusion at [address] [place] [address] on 5/31/22 for COVID-19. Please follow-up with pt for post-infusion assessment and home monitoring if needed. Thank you.

## 2022-06-02 NOTE — TELEPHONE ENCOUNTER
Home Monitoring Day 2  of 3. What  was your temp today? Never had fever but had chills, body aches        What was your pulse ox today? 100 %was 91 % when he sat up went up to 100    Are you feeling short of breath today?     none  Is the shortness of breath better, the same, or worse than yesterday? None       Are you having a cough today? A little     Is the cough better, the same, or worse than yesterday? better    Are you experiencing weakness today? Very weakness     Is the weakness better, the same or worse than yesterday? Same     How is your appetite compared to yesterday?      Same but feels hungry today     Are you vomiting?   no    Are you experiencing diarrhea?no    Any loss of taste or smell?no      Nursing notes: Pt stated overall he now better,weakness is the main s/s feels like he's been hit by a brick, staying hydrated , will try to eat more today since he feels hungry

## 2022-10-10 DIAGNOSIS — I10 ESSENTIAL HYPERTENSION: ICD-10-CM

## 2022-10-10 DIAGNOSIS — I63.9 CEREBROVASCULAR ACCIDENT (CVA), UNSPECIFIED MECHANISM (HCC): ICD-10-CM

## 2022-10-11 RX ORDER — CLOPIDOGREL BISULFATE 75 MG/1
75 TABLET ORAL DAILY
Qty: 90 TABLET | Refills: 0 | Status: SHIPPED | OUTPATIENT
Start: 2022-10-11 | End: 2023-01-16

## 2022-10-11 RX ORDER — LISINOPRIL AND HYDROCHLOROTHIAZIDE 25; 20 MG/1; MG/1
1 TABLET ORAL DAILY
Qty: 90 TABLET | Refills: 0 | Status: SHIPPED | OUTPATIENT
Start: 2022-10-11 | End: 2023-01-16

## 2022-10-11 RX ORDER — SIMVASTATIN 10 MG
10 TABLET ORAL NIGHTLY
Qty: 90 TABLET | Refills: 0 | Status: SHIPPED | OUTPATIENT
Start: 2022-10-11 | End: 2023-01-16

## 2022-10-11 NOTE — TELEPHONE ENCOUNTER
Protocol failed or has No Protocol, please review  Requested Prescriptions   Pending Prescriptions Disp Refills    SIMVASTATIN 10 MG Oral Tab [Pharmacy Med Name: SIMVASTATIN TABS 10MG] 90 tablet 3     Sig: TAKE 1 TABLET BY MOUTH NIGHTLY        Cholesterol Medication Protocol Failed - 10/10/2022  9:37 AM        Failed - ALT < 80       Lab Results   Component Value Date    ALT 30 10/02/2021             Failed - ALT resulted within past year        Failed - Lipid panel within past 12 months       Lab Results   Component Value Date    CHOLEST 149 10/02/2021    TRIG 113 10/02/2021    HDL 50 10/02/2021    LDL 79 10/02/2021    VLDL 18 10/02/2021    [place] 99 10/02/2021             Failed - Appointment within past 12 or next 3 months           CLOPIDOGREL 75 MG Oral Tab [Pharmacy Med Name: CLOPIDOGREL BISULFATE TABS 75MG] 90 tablet 3     Sig: TAKE 1 TABLET BY MOUTH DAILY        There is no refill protocol information for this order        LISINOPRIL-HYDROCHLOROTHIAZIDE 20-25 MG Oral Tab [Pharmacy Med Name: LISINOPRIL/HCTZ TABS 20/25MG] 90 tablet 3     Sig: TAKE 1 TABLET BY MOUTH ONE TIME DAILY        Hypertension Medications Protocol Failed - 10/10/2022  9:37 AM        Failed - CMP or BMP in past 12 months        Failed - Appointment in past 6 or next 3 months        Passed - Last serum creatinine< 2.0     Lab Results   Component Value Date    CREATSERUM 0.97 10/02/2021                       Recent Outpatient Visits              1 year ago Physical exam, annual    Patti Genao MD    Office Visit    1 year ago Essential hypertension    Ema Wiley, Jose Desai MD    Office Visit    2 years ago History of colon polyps    Mitzi Swann Marinell Sam, MD    Office Visit    2 years ago Occlusion and stenosis of bilateral carotid arteries    [facility], Eliazar Starks MD    Office Visit    2 years ago Physical exam    Neftaly Slade MD    Office Visit

## 2023-01-16 DIAGNOSIS — I63.9 CEREBROVASCULAR ACCIDENT (CVA), UNSPECIFIED MECHANISM (HCC): ICD-10-CM

## 2023-01-16 DIAGNOSIS — I10 ESSENTIAL HYPERTENSION: ICD-10-CM

## 2023-01-17 RX ORDER — SIMVASTATIN 10 MG
10 TABLET ORAL NIGHTLY
Qty: 90 TABLET | Refills: 0 | Status: SHIPPED | OUTPATIENT
Start: 2023-01-17

## 2023-01-17 RX ORDER — CLOPIDOGREL BISULFATE 75 MG/1
75 TABLET ORAL DAILY
Qty: 90 TABLET | Refills: 0 | Status: SHIPPED | OUTPATIENT
Start: 2023-01-17

## 2023-01-17 RX ORDER — LISINOPRIL AND HYDROCHLOROTHIAZIDE 25; 20 MG/1; MG/1
1 TABLET ORAL DAILY
Qty: 90 TABLET | Refills: 0 | Status: SHIPPED | OUTPATIENT
Start: 2023-01-17

## 2023-01-17 NOTE — TELEPHONE ENCOUNTER
Noted upcoming appointment so refilled but if he misses his apt , he will need to find another primary care physician as he has multiple medical issues and is not compliant with following up

## 2023-01-23 ENCOUNTER — OFFICE VISIT (OUTPATIENT)
Dept: INTERNAL MEDICINE CLINIC | Facility: CLINIC | Age: 63
End: 2023-01-23

## 2023-01-23 VITALS
WEIGHT: 171.81 LBS | HEIGHT: 72 IN | SYSTOLIC BLOOD PRESSURE: 120 MMHG | DIASTOLIC BLOOD PRESSURE: 56 MMHG | HEART RATE: 66 BPM | BODY MASS INDEX: 23.27 KG/M2

## 2023-01-23 DIAGNOSIS — Z00.00 PHYSICAL EXAM, ANNUAL: Primary | ICD-10-CM

## 2023-01-23 DIAGNOSIS — Z12.5 ENCOUNTER FOR SCREENING FOR MALIGNANT NEOPLASM OF PROSTATE: ICD-10-CM

## 2023-01-23 DIAGNOSIS — Z12.11 COLON CANCER SCREENING: ICD-10-CM

## 2023-01-23 PROCEDURE — 3008F BODY MASS INDEX DOCD: CPT | Performed by: INTERNAL MEDICINE

## 2023-01-23 PROCEDURE — 3078F DIAST BP <80 MM HG: CPT | Performed by: INTERNAL MEDICINE

## 2023-01-23 PROCEDURE — 3074F SYST BP LT 130 MM HG: CPT | Performed by: INTERNAL MEDICINE

## 2023-01-23 PROCEDURE — 99396 PREV VISIT EST AGE 40-64: CPT | Performed by: INTERNAL MEDICINE

## 2023-01-23 PROCEDURE — 90471 IMMUNIZATION ADMIN: CPT | Performed by: INTERNAL MEDICINE

## 2023-01-23 PROCEDURE — 90686 IIV4 VACC NO PRSV 0.5 ML IM: CPT | Performed by: INTERNAL MEDICINE

## 2023-02-22 ENCOUNTER — LAB ENCOUNTER (OUTPATIENT)
Dept: LAB | Age: 63
End: 2023-02-22
Attending: INTERNAL MEDICINE
Payer: COMMERCIAL

## 2023-02-22 DIAGNOSIS — Z12.5 ENCOUNTER FOR SCREENING FOR MALIGNANT NEOPLASM OF PROSTATE: ICD-10-CM

## 2023-02-22 DIAGNOSIS — Z00.00 PHYSICAL EXAM, ANNUAL: ICD-10-CM

## 2023-02-22 LAB
ALBUMIN SERPL-MCNC: 4.3 G/DL (ref 3.4–5)
ALBUMIN/GLOB SERPL: 1.3 {RATIO} (ref 1–2)
ALP LIVER SERPL-CCNC: 89 U/L
ALT SERPL-CCNC: 28 U/L
ANION GAP SERPL CALC-SCNC: 7 MMOL/L (ref 0–18)
AST SERPL-CCNC: 21 U/L (ref 15–37)
BILIRUB SERPL-MCNC: 0.4 MG/DL (ref 0.1–2)
BUN BLD-MCNC: 28 MG/DL (ref 7–18)
BUN/CREAT SERPL: 28.6 (ref 10–20)
CALCIUM BLD-MCNC: 9.6 MG/DL (ref 8.5–10.1)
CHLORIDE SERPL-SCNC: 106 MMOL/L (ref 98–112)
CHOLEST SERPL-MCNC: 150 MG/DL (ref ?–200)
CO2 SERPL-SCNC: 26 MMOL/L (ref 21–32)
COMPLEXED PSA SERPL-MCNC: 1.76 NG/ML (ref ?–4)
CREAT BLD-MCNC: 0.98 MG/DL
DEPRECATED RDW RBC AUTO: 40.8 FL (ref 35.1–46.3)
ERYTHROCYTE [DISTWIDTH] IN BLOOD BY AUTOMATED COUNT: 12.5 % (ref 11–15)
FASTING PATIENT LIPID ANSWER: YES
FASTING STATUS PATIENT QL REPORTED: YES
GFR SERPLBLD BASED ON 1.73 SQ M-ARVRAT: 87 ML/MIN/1.73M2 (ref 60–?)
GLOBULIN PLAS-MCNC: 3.4 G/DL (ref 2.8–4.4)
GLUCOSE BLD-MCNC: 108 MG/DL (ref 70–99)
HCT VFR BLD AUTO: 45.2 %
HDLC SERPL-MCNC: 58 MG/DL (ref 40–59)
HGB BLD-MCNC: 14.9 G/DL
LDLC SERPL CALC-MCNC: 79 MG/DL (ref ?–100)
MCH RBC QN AUTO: 29.4 PG (ref 26–34)
MCHC RBC AUTO-ENTMCNC: 33 G/DL (ref 31–37)
MCV RBC AUTO: 89.2 FL
NONHDLC SERPL-MCNC: 92 MG/DL (ref ?–130)
OSMOLALITY SERPL CALC.SUM OF ELEC: 294 MOSM/KG (ref 275–295)
PLATELET # BLD AUTO: 274 10(3)UL (ref 150–450)
POTASSIUM SERPL-SCNC: 4.5 MMOL/L (ref 3.5–5.1)
PROT SERPL-MCNC: 7.7 G/DL (ref 6.4–8.2)
RBC # BLD AUTO: 5.07 X10(6)UL
SODIUM SERPL-SCNC: 139 MMOL/L (ref 136–145)
TRIGL SERPL-MCNC: 67 MG/DL (ref 30–149)
TSI SER-ACNC: 2.06 MIU/ML (ref 0.36–3.74)
VLDLC SERPL CALC-MCNC: 10 MG/DL (ref 0–30)
WBC # BLD AUTO: 7.2 X10(3) UL (ref 4–11)

## 2023-02-22 PROCEDURE — 80053 COMPREHEN METABOLIC PANEL: CPT

## 2023-02-22 PROCEDURE — 36415 COLL VENOUS BLD VENIPUNCTURE: CPT

## 2023-02-22 PROCEDURE — 85027 COMPLETE CBC AUTOMATED: CPT

## 2023-02-22 PROCEDURE — 80061 LIPID PANEL: CPT

## 2023-02-22 PROCEDURE — 84443 ASSAY THYROID STIM HORMONE: CPT

## 2023-02-25 DIAGNOSIS — R73.01 ELEVATED FASTING BLOOD SUGAR: Primary | ICD-10-CM

## 2023-04-18 DIAGNOSIS — I10 ESSENTIAL HYPERTENSION: ICD-10-CM

## 2023-04-18 DIAGNOSIS — I63.9 CEREBROVASCULAR ACCIDENT (CVA), UNSPECIFIED MECHANISM (HCC): ICD-10-CM

## 2023-04-19 RX ORDER — SIMVASTATIN 10 MG
10 TABLET ORAL NIGHTLY
Qty: 90 TABLET | Refills: 3 | Status: SHIPPED | OUTPATIENT
Start: 2023-04-19

## 2023-04-19 RX ORDER — CLOPIDOGREL BISULFATE 75 MG/1
75 TABLET ORAL DAILY
Qty: 90 TABLET | Refills: 3 | Status: SHIPPED | OUTPATIENT
Start: 2023-04-19

## 2023-04-19 RX ORDER — LISINOPRIL AND HYDROCHLOROTHIAZIDE 25; 20 MG/1; MG/1
1 TABLET ORAL DAILY
Qty: 90 TABLET | Refills: 3 | Status: SHIPPED | OUTPATIENT
Start: 2023-04-19

## 2023-04-19 NOTE — TELEPHONE ENCOUNTER
Refill passed per [facility] protocol. Requested Prescriptions   Pending Prescriptions Disp Refills    simvastatin 10 MG Oral Tab 90 tablet 0     Sig: Take 1 tablet (10 mg total) by mouth nightly. Cholesterol Medication Protocol Passed - 4/18/2023  5:55 PM        Passed - ALT < 80     Lab Results   Component Value Date    ALT 28 02/22/2023             Passed - ALT resulted within past year        Passed - Lipid panel within past 12 months     Lab Results   Component Value Date    CHOLEST 150 02/22/2023    TRIG 67 02/22/2023    HDL 58 02/22/2023    LDL 79 02/22/2023    VLDL 10 02/22/2023    [place] 92 02/22/2023             Passed - Appointment within past 12 or next 3 months          clopidogrel 75 MG Oral Tab 90 tablet 0     Sig: Take 1 tablet (75 mg total) by mouth daily. There is no refill protocol information for this order       lisinopril-hydroCHLOROthiazide 20-25 MG Oral Tab 90 tablet 0     Sig: Take 1 tablet by mouth daily.        Hypertension Medications Protocol Passed - 4/18/2023  5:55 PM        Passed - CMP or BMP in past 12 months        Passed - Last serum creatinine< 2.0     Lab Results   Component Value Date    CREATSERUM 0.98 02/22/2023                 Passed - Appointment in past 6 or next 3 months           Recent Outpatient Visits              2 months ago Physical exam, annual    Katelin Solis MD    Office Visit    1 year ago Physical exam, annual    Katelin Solis MD    Office Visit    2 years ago Essential hypertension    [facility] November, Charley Pennington MD    Office Visit    3 years ago History of colon polyps    Ernesto Perez, [facility], Christy Nash MD    Office Visit    3 years ago Occlusion and stenosis of bilateral carotid arteries    [facility], [place] Sherell Lesches, MD    Office Visit

## 2023-04-19 NOTE — TELEPHONE ENCOUNTER
Protocol failed or has No Protocol, please review  Requested Prescriptions   Pending Prescriptions Disp Refills    clopidogrel 75 MG Oral Tab 90 tablet 0     Sig: Take 1 tablet (75 mg total) by mouth daily. There is no refill protocol information for this order      Signed Prescriptions Disp Refills    simvastatin 10 MG Oral Tab 90 tablet 3     Sig: Take 1 tablet (10 mg total) by mouth nightly. Cholesterol Medication Protocol Passed - 4/18/2023  5:55 PM        Passed - ALT < 80     Lab Results   Component Value Date    ALT 28 02/22/2023             Passed - ALT resulted within past year        Passed - Lipid panel within past 12 months     Lab Results   Component Value Date    CHOLEST 150 02/22/2023    TRIG 67 02/22/2023    HDL 58 02/22/2023    LDL 79 02/22/2023    VLDL 10 02/22/2023    [place] 92 02/22/2023             Passed - Appointment within past 12 or next 3 months          lisinopril-hydroCHLOROthiazide 20-25 MG Oral Tab 90 tablet 3     Sig: Take 1 tablet by mouth daily.        Hypertension Medications Protocol Passed - 4/18/2023  5:55 PM        Passed - CMP or BMP in past 12 months        Passed - Last serum creatinine< 2.0     Lab Results   Component Value Date    CREATSERUM 0.98 02/22/2023                 Passed - Appointment in past 6 or next 3 months             Recent Outpatient Visits              2 months ago Physical exam, annual    Charley Moon MD    Office Visit    1 year ago Physical exam, annual    Charley Moon MD    Office Visit    2 years ago Essential hypertension    Charley Moon MD    Office Visit    3 years ago History of colon polyps    Clara Paez, [address] [place] [address], Konrad Smallwood MD    Office Visit    3 years ago Occlusion and stenosis of bilateral carotid arteries [facility] [place] Jack MD    Office Visit

## 2023-10-19 DIAGNOSIS — I10 ESSENTIAL HYPERTENSION: ICD-10-CM

## 2023-10-19 DIAGNOSIS — I63.9 CEREBROVASCULAR ACCIDENT (CVA), UNSPECIFIED MECHANISM (HCC): ICD-10-CM

## 2023-10-19 RX ORDER — LISINOPRIL AND HYDROCHLOROTHIAZIDE 25; 20 MG/1; MG/1
1 TABLET ORAL DAILY
Qty: 90 TABLET | Refills: 0 | Status: SHIPPED | OUTPATIENT
Start: 2023-10-19

## 2023-10-19 RX ORDER — SIMVASTATIN 10 MG
10 TABLET ORAL NIGHTLY
Qty: 90 TABLET | Refills: 3 | Status: SHIPPED | OUTPATIENT
Start: 2023-10-19

## 2023-10-20 RX ORDER — CLOPIDOGREL BISULFATE 75 MG/1
75 TABLET ORAL DAILY
Qty: 90 TABLET | Refills: 0 | Status: SHIPPED | OUTPATIENT
Start: 2023-10-20

## 2023-10-20 NOTE — TELEPHONE ENCOUNTER
Please review. Protocol failed / No Protocol. Requested Prescriptions   Pending Prescriptions Disp Refills    simvastatin 10 MG Oral Tab 90 tablet 3     Sig: Take 1 tablet (10 mg total) by mouth nightly. Cholesterol Medication Protocol Passed - 10/19/2023  5:25 AM        Passed - ALT < 80     Lab Results   Component Value Date    ALT 28 02/22/2023             Passed - ALT resulted within past year        Passed - Lipid panel within past 12 months     Lab Results   Component Value Date    CHOLEST 150 02/22/2023    TRIG 67 02/22/2023    HDL 58 02/22/2023    LDL 79 02/22/2023    VLDL 10 02/22/2023    [place] 92 02/22/2023             Passed - Appointment within past 12 or next 3 months          clopidogrel 75 MG Oral Tab 90 tablet 3     Sig: Take 1 tablet (75 mg total) by mouth daily. There is no refill protocol information for this order       lisinopril-hydroCHLOROthiazide 20-25 MG Oral Tab 90 tablet 3     Sig: Take 1 tablet by mouth daily.        Hypertension Medications Protocol Failed - 10/19/2023  5:25 AM        Failed - Appointment in past 6 or next 3 months        Passed - CMP or BMP in past 12 months        Passed - Last serum creatinine< 2.0     Lab Results   Component Value Date    CREATSERUM 0.98 02/22/2023

## 2023-10-20 NOTE — TELEPHONE ENCOUNTER
[employer] message sent to patient to schedule an office visit with PCP.    Please make a phone attempts

## 2023-12-27 ENCOUNTER — OFFICE VISIT (OUTPATIENT)
Dept: INTERNAL MEDICINE CLINIC | Facility: CLINIC | Age: 63
End: 2023-12-27
Payer: COMMERCIAL

## 2023-12-27 VITALS
SYSTOLIC BLOOD PRESSURE: 150 MMHG | HEIGHT: 72 IN | WEIGHT: 171 LBS | DIASTOLIC BLOOD PRESSURE: 82 MMHG | OXYGEN SATURATION: 97 % | BODY MASS INDEX: 23.16 KG/M2 | HEART RATE: 84 BPM

## 2023-12-27 DIAGNOSIS — Z86.73 HISTORY OF CVA (CEREBROVASCULAR ACCIDENT): ICD-10-CM

## 2023-12-27 DIAGNOSIS — I10 ESSENTIAL HYPERTENSION: ICD-10-CM

## 2023-12-27 DIAGNOSIS — R53.1 RIGHT SIDED WEAKNESS: ICD-10-CM

## 2023-12-27 DIAGNOSIS — R20.0 RIGHT SIDED NUMBNESS: Primary | ICD-10-CM

## 2023-12-27 PROCEDURE — 3008F BODY MASS INDEX DOCD: CPT

## 2023-12-27 PROCEDURE — 99214 OFFICE O/P EST MOD 30 MIN: CPT

## 2023-12-27 PROCEDURE — 3077F SYST BP >= 140 MM HG: CPT

## 2023-12-27 PROCEDURE — 3079F DIAST BP 80-89 MM HG: CPT

## 2023-12-27 RX ORDER — SIMVASTATIN 10 MG
10 TABLET ORAL NIGHTLY
Qty: 90 TABLET | Refills: 3 | Status: SHIPPED | OUTPATIENT
Start: 2023-12-27 | End: 2023-12-29

## 2023-12-27 RX ORDER — ASPIRIN 81 MG/1
81 TABLET ORAL DAILY
Qty: 90 TABLET | Refills: 3 | Status: SHIPPED | OUTPATIENT
Start: 2023-12-27

## 2023-12-27 RX ORDER — CLOPIDOGREL BISULFATE 75 MG/1
75 TABLET ORAL DAILY
Qty: 90 TABLET | Refills: 3 | Status: SHIPPED | OUTPATIENT
Start: 2023-12-27

## 2023-12-27 RX ORDER — LISINOPRIL AND HYDROCHLOROTHIAZIDE 25; 20 MG/1; MG/1
2 TABLET ORAL DAILY
Qty: 90 TABLET | Refills: 0 | Status: SHIPPED | OUTPATIENT
Start: 2023-12-27

## 2023-12-28 ENCOUNTER — HOSPITAL ENCOUNTER (OUTPATIENT)
Dept: GENERAL RADIOLOGY | Age: 63
Discharge: HOME | End: 2023-12-28
Payer: COMMERCIAL

## 2023-12-28 ENCOUNTER — HOSPITAL ENCOUNTER (OUTPATIENT)
Dept: CT IMAGING | Facility: HOSPITAL | Age: 63
Discharge: HOME | End: 2023-12-28
Payer: COMMERCIAL

## 2023-12-28 ENCOUNTER — HOSPITAL ENCOUNTER (OUTPATIENT)
Dept: MRI IMAGING | Age: 63
Discharge: HOME | End: 2023-12-28
Payer: COMMERCIAL

## 2023-12-28 DIAGNOSIS — Z86.73 HISTORY OF CVA (CEREBROVASCULAR ACCIDENT): ICD-10-CM

## 2023-12-28 DIAGNOSIS — R20.0 RIGHT SIDED NUMBNESS: ICD-10-CM

## 2023-12-28 DIAGNOSIS — I10 ESSENTIAL HYPERTENSION: ICD-10-CM

## 2023-12-28 DIAGNOSIS — R53.1 RIGHT SIDED WEAKNESS: ICD-10-CM

## 2023-12-28 LAB
CREAT BLD-MCNC: 0.9 MG/DL
EGFRCR SERPLBLD CKD-EPI 2021: 95 ML/MIN/1.73M2 (ref 60–?)

## 2023-12-28 PROCEDURE — 70551 MRI BRAIN STEM W/O DYE: CPT

## 2023-12-28 PROCEDURE — 70496 CT ANGIOGRAPHY HEAD: CPT

## 2023-12-28 PROCEDURE — 70030 X-RAY EYE FOR FOREIGN BODY: CPT

## 2023-12-28 PROCEDURE — 70498 CT ANGIOGRAPHY NECK: CPT

## 2023-12-28 PROCEDURE — 82565 ASSAY OF CREATININE: CPT

## 2023-12-28 NOTE — PATIENT INSTRUCTIONS
Please try to cut down on coffee especially caffeinated coffee  Increase water intake   If you develop acute symptoms of a stroke call 911  See neurology as soon as possible for an appointment

## 2023-12-29 ENCOUNTER — LAB ENCOUNTER (OUTPATIENT)
Dept: LAB | Facility: HOSPITAL | Age: 63
End: 2023-12-29
Attending: Other
Payer: COMMERCIAL

## 2023-12-29 ENCOUNTER — OFFICE VISIT (OUTPATIENT)
Dept: NEUROLOGY | Facility: CLINIC | Age: 63
End: 2023-12-29
Payer: COMMERCIAL

## 2023-12-29 VITALS — BODY MASS INDEX: 23.16 KG/M2 | HEIGHT: 72 IN | WEIGHT: 171 LBS

## 2023-12-29 DIAGNOSIS — R73.01 ELEVATED FASTING BLOOD SUGAR: ICD-10-CM

## 2023-12-29 DIAGNOSIS — R20.0 RIGHT SIDED NUMBNESS: Primary | ICD-10-CM

## 2023-12-29 DIAGNOSIS — I65.22 LEFT CAROTID STENOSIS: ICD-10-CM

## 2023-12-29 DIAGNOSIS — Z86.73 HISTORY OF ISCHEMIC LEFT MCA STROKE: ICD-10-CM

## 2023-12-29 DIAGNOSIS — R20.0 RIGHT SIDED NUMBNESS: ICD-10-CM

## 2023-12-29 DIAGNOSIS — F17.200 SMOKER: ICD-10-CM

## 2023-12-29 PROBLEM — I65.23 CAROTID STENOSIS, BILATERAL: Status: ACTIVE | Noted: 2020-01-09

## 2023-12-29 LAB
CK SERPL-CCNC: 271 U/L
EST. AVERAGE GLUCOSE BLD GHB EST-MCNC: 137 MG/DL (ref 68–126)
FOLATE SERPL-MCNC: >24 NG/ML (ref 5.4–?)
HBA1C MFR BLD: 6.4 % (ref ?–5.7)
VIT B12 SERPL-MCNC: 636 PG/ML (ref 211–911)

## 2023-12-29 PROCEDURE — 86334 IMMUNOFIX E-PHORESIS SERUM: CPT

## 2023-12-29 PROCEDURE — 82607 VITAMIN B-12: CPT

## 2023-12-29 PROCEDURE — 3008F BODY MASS INDEX DOCD: CPT | Performed by: OTHER

## 2023-12-29 PROCEDURE — 82550 ASSAY OF CK (CPK): CPT

## 2023-12-29 PROCEDURE — 36415 COLL VENOUS BLD VENIPUNCTURE: CPT

## 2023-12-29 PROCEDURE — 82746 ASSAY OF FOLIC ACID SERUM: CPT

## 2023-12-29 PROCEDURE — 83036 HEMOGLOBIN GLYCOSYLATED A1C: CPT

## 2023-12-29 PROCEDURE — 84446 ASSAY OF VITAMIN E: CPT

## 2023-12-29 PROCEDURE — 99205 OFFICE O/P NEW HI 60 MIN: CPT | Performed by: OTHER

## 2023-12-29 PROCEDURE — 84425 ASSAY OF VITAMIN B-1: CPT

## 2023-12-29 RX ORDER — VARENICLINE TARTRATE 0.5 (11)-1
KIT ORAL
Qty: 190 EACH | Refills: 1 | Status: SHIPPED | OUTPATIENT
Start: 2023-12-29 | End: 2024-04-03

## 2023-12-29 RX ORDER — ATORVASTATIN CALCIUM 40 MG/1
40 TABLET, FILM COATED ORAL NIGHTLY
Qty: 90 TABLET | Refills: 1 | Status: SHIPPED | OUTPATIENT
Start: 2023-12-29 | End: 2024-06-26

## 2023-12-29 NOTE — PATIENT INSTRUCTIONS
I think you have carpal tunnel and an ulnar neuropathy in your arm. You need an EMG to diagnose these. You could be having seizures in the left side of your brain causing numbness. I think this is NOT likely but I ordered an EEG (brain wave test to look). Wear wrist splints at night when you sleep. Wrap your arm in a towel at the elbow when you sleep. Follow up with Dr. Dwayne Brown regarding your carotid artery     B.E. F.A.S.T.    B: Balance-- sudden loss  of balance, staggering gait, severe vertigo        E: Eyes-- sudden loss of vision in one or both eyes, onset of double vision        F: Face-- uneven or drooping face, drooling, ask the patient to smile        A: Arm (leg)-- loss of strength or sensation on one side of the body in the arm and/or leg        S: Speech-- slurring of speech, difficulty  saying words or understanding what is being said, sudden confusion        T: Terrible headache (time*)-- very severe headache which has maximum intensity within seconds to a minute        * Time of symptom onset and last known well times are important when determining what treatment is appropriate for an individual's stroke, particularly since treatment is time limited. Time is not a symptom or sign of stroke. Traditionally, Time was used for the \"T\" in the FAST and BEFAST acronyms for stroke awareness. Since terrible headache can be a symptom of stroke this is substituted. However, as the acronym B.E. F.A.S.T. suggests, acting quickly is of critical importance after stroke is suspected. Knowing the symptom onset time or time when last well will have an impact on what treatments can be offered safely.

## 2024-01-02 LAB
VIT E ALPHA TOCO: 7.6 MG/L
VIT E GAMMA TOCO: 1 MG/L

## 2024-01-10 DIAGNOSIS — R20.0 NUMBNESS: Primary | ICD-10-CM

## 2024-01-19 ENCOUNTER — HOSPITAL ENCOUNTER (OUTPATIENT)
Dept: ELECTROPHYSIOLOGY | Facility: HOSPITAL | Age: 64
Discharge: HOME | End: 2024-01-19
Attending: Other
Payer: COMMERCIAL

## 2024-01-19 PROBLEM — R20.0 RIGHT SIDED NUMBNESS: Status: ACTIVE | Noted: 2024-01-19

## 2024-01-19 NOTE — PROCEDURES
EEG report    REFERRING PHYSICIAN: Ezequiel Dennis DO    PCP and phone number:  Leigh Pace MD  [phone]    TECHNIQUE: 21 channels of EEG, 2 channels of EOG, and 1 channel of EKG were recorded utilizing the International 10/20 System. The recording was performed in a digitized monopolar referential format and playback was reformatted into various referential and bipolar montages utilizing appropriate filter settings. Automatic seizure and spike detection programs were utilized throughout the recording.  Video was not recorded during the study    CLINICAL DATA:  Patient is sent for the evaluation of possible seizures.    MEDICATION:  Continuous Medications:      Scheduled Medications:    Current Outpatient Medications:     Varenicline Tartrate, Starter, (CHANTIX STARTING MONTH PAK) 0.5 MG X 11 & 1 MG X 42 Oral Tablet Therapy Pack, Take 0.5 mg by mouth daily for 3 days, THEN 0.5 mg 2 (two) times a day for 3 days, THEN 1 mg 2 (two) times a day., Disp: 190 each, Rfl: 1    atorvastatin 40 MG Oral Tab, Take 1 tablet (40 mg total) by mouth nightly., Disp: 90 tablet, Rfl: 1    lisinopril-hydroCHLOROthiazide 20-25 MG Oral Tab, Take 2 tablets by mouth daily., Disp: 90 tablet, Rfl: 0    aspirin 81 MG Oral Tab EC, Take 1 tablet (81 mg total) by mouth daily., Disp: 90 tablet, Rfl: 3    clopidogrel 75 MG Oral Tab, Take 1 tablet (75 mg total) by mouth daily., Disp: 90 tablet, Rfl: 3    PRN Medications:      ACTIVATION:  Hyperventilation: Not done  Photic stimulation: Not done  Sleep: Normal sleep architecture was seen.    BACKGROUND  While the patient was awake, the posterior dominant rhythm consisted of well-regulated 9-10 Hz rhythmic waveforms, symmetrically distributed over both posterior quadrants and was reactive to eye opening.    EEG ABNORMALITY  None    IMPRESSION:  This is a normal EEG. No focal, lateralized, or epileptiform features are noted. Clinical correlation required.

## 2024-01-31 ENCOUNTER — NURSE TRIAGE (OUTPATIENT)
Dept: INTERNAL MEDICINE CLINIC | Facility: CLINIC | Age: 64
End: 2024-01-31

## 2024-01-31 NOTE — TELEPHONE ENCOUNTER
Action Requested: Summary for Provider     []  Critical Lab, Recommendations Needed  [] Need Additional Advice  []   FYI    []   Need Orders  [] Need Medications Sent to Pharmacy  []  Other     SUMMARY: Per protocol, nurse to determine disposition. Routing to Dr. Pace for review.     Reason for call: Medication Problem  Onset: Data Unavailable    Spoke to spouse, (on JOSE RAMON, verified patient's name and ). Patient started Chantix ordered by Dr. Dennis about a month ago. Spouse is concerned about side effects that he is having. RN recommended contacting Dr. Dennis and Becca wright. She said that since starting Chantix, patient is very grumpy- snapping at everyone. He is depressed and won't do simple tasks around the house anymore. He is fatigued. RN relayed that fatigue, lethargy, malaise- can all be side effects of Chantix and being irritable can happen when cutting back or stopping nicotine. He is not expressing SI or HI.     She would like to know if patient can  take Chantix every other day versus daily to try and decrease symptoms. She is happy to bring patient in to see Dr. Pace sooner than 24 physical.     Dr. Pace, please advise.     Triage, please call Rufe with response.     Reason for Disposition   Caller has NON-URGENT medicine question about med that PCP or specialist prescribed and triager unable to answer question    Protocols used: Medication Question Call-A-[place]

## 2024-01-31 NOTE — TELEPHONE ENCOUNTER
Spoke with Christina regarding provider's recommendations.  Wife verbalized understanding and will let patient know.

## 2024-01-31 NOTE — TELEPHONE ENCOUNTER
This should be okay to take every other day  I usually do not give Chantix so he/ she should really go back to the prescribing doctor

## 2024-02-19 ENCOUNTER — OFFICE VISIT (OUTPATIENT)
Dept: INTERNAL MEDICINE CLINIC | Facility: CLINIC | Age: 64
End: 2024-02-19
Payer: COMMERCIAL

## 2024-02-19 VITALS
HEART RATE: 72 BPM | WEIGHT: 173 LBS | BODY MASS INDEX: 23.43 KG/M2 | HEIGHT: 72 IN | RESPIRATION RATE: 18 BRPM | SYSTOLIC BLOOD PRESSURE: 135 MMHG | DIASTOLIC BLOOD PRESSURE: 67 MMHG

## 2024-02-19 DIAGNOSIS — F41.9 ANXIETY AND DEPRESSION: ICD-10-CM

## 2024-02-19 DIAGNOSIS — F32.A ANXIETY AND DEPRESSION: ICD-10-CM

## 2024-02-19 DIAGNOSIS — Z12.5 ENCOUNTER FOR SCREENING FOR MALIGNANT NEOPLASM OF PROSTATE: ICD-10-CM

## 2024-02-19 DIAGNOSIS — H43.399 VITREOUS FLOATERS, UNSPECIFIED LATERALITY: ICD-10-CM

## 2024-02-19 DIAGNOSIS — R73.03 PREDIABETES: ICD-10-CM

## 2024-02-19 DIAGNOSIS — Z00.00 PHYSICAL EXAM, ANNUAL: Primary | ICD-10-CM

## 2024-02-19 DIAGNOSIS — I10 ESSENTIAL HYPERTENSION: ICD-10-CM

## 2024-02-19 DIAGNOSIS — Z12.11 COLON CANCER SCREENING: ICD-10-CM

## 2024-02-19 PROCEDURE — 99396 PREV VISIT EST AGE 40-64: CPT | Performed by: INTERNAL MEDICINE

## 2024-02-19 RX ORDER — LISINOPRIL AND HYDROCHLOROTHIAZIDE 25; 20 MG/1; MG/1
2 TABLET ORAL DAILY
Qty: 180 TABLET | Refills: 1 | Status: SHIPPED | OUTPATIENT
Start: 2024-02-19

## 2024-02-19 NOTE — PROGRESS NOTES
Jonnie Leon is a 63 year old male.  No chief complaint on file.      HPI:   Comes for his annual physical--here with his wife   C/C annual physical  C/o numbness and tingling on the fingers and toes , noted he was seen for this in dec   Saw neuro and the atorvastatin was increased to 40 mg  Already had a pot of coffee            HISTORY   11/4/2019 first visit  new pt   C/c htn   C/o [place] his [place] fe[place] and now working as a  for [employer]   Needs refills  Does not watch his diet and likes to add salt to his food, eats popcorn  \" I salt everything\"        Lives with wife and son   Works for [employer] other parts     PMH  htn  HL  cvs in 2012 with right sided weakness and with some right-sided numbness is residual  Right VAS s/p carotid endarterectomy dr sharma   Anxiety - on bupropion   Depression -used to see  Dr.Christina Fontana in in [facility]-- h/o hospitalization for suicidal ideation.  tob dep   History of borderline diabetes  Right ureteral stone 3/2021   h/o covid may 2022     Current Outpatient Medications   Medication Sig Dispense Refill    lisinopril-hydroCHLOROthiazide 20-25 MG Oral Tab Take 2 tablets by mouth daily. 180 tablet 1    Varenicline Tartrate, Starter, (CHANTIX STARTING MONTH PAK) 0.5 MG X 11 & 1 MG X 42 Oral Tablet Therapy Pack Take 0.5 mg by mouth daily for 3 days, THEN 0.5 mg 2 (two) times a day for 3 days, THEN 1 mg 2 (two) times a day. 190 each 1    atorvastatin 40 MG Oral Tab Take 1 tablet (40 mg total) by mouth nightly. 90 tablet 1    aspirin 81 MG Oral Tab EC Take 1 tablet (81 mg total) by mouth daily. 90 tablet 3    clopidogrel 75 MG Oral Tab Take 1 tablet (75 mg total) by mouth daily. 90 tablet 3      Past Medical History:   Diagnosis Date    Acute, but ill-defined, cerebrovascular disease     Anxiety     Anxiety state     Calculus of kidney     Dehydration 2012    Depression     Essential hypertension     Gastroenteritis 2012    High blood  pressure     High cholesterol     Lipid screening 04/24/2013    Per [facility]    Muscle weakness     right sided weakness after CVA    Stroke (HCC) 2012    right side weakness    Visual impairment       Past Surgical History:   Procedure Laterality Date    Arthroscopy of joint unlisted Right knee    Back surgery  1991    Colonoscopy  07/09/2010    Per [facility]    Cystoscopy,insert ureteral stent Right 03/09/2021    Dr. Moss @ [facility]    Cystouretro w/stone remove Right 03/26/2021    Dr. Moss @ [facility]    Musculoskeletal surgery unlisted Right 2008    Right shoulder surgery    Other surgical history  knee,shoulder,neck    Tonsillectomy  child      Social History:  Social History     Socioeconomic History    Marital status:    Tobacco Use    Smoking status: Every Day     Packs/day: 0.50     Years: 45.00     Additional pack years: 0.00     Total pack years: 22.50     Types: Cigarettes    Smokeless tobacco: Never   Vaping Use    Vaping Use: Former   Substance and Sexual Activity    Alcohol use: Yes     Alcohol/week: 2.0 standard drinks of alcohol     Types: 2 Cans of beer per week     Comment: last drink a week ago    Drug use: Yes     Frequency: 1.0 times per week     Types: Cannabis   Other Topics Concern    Caffeine Concern Yes     Comment: Coffee, > 6 cups daily        REVIEW OF SYSTEMS:   GENERAL HEALTH: No fevers, chills, + occ sweats- at night , + fatigue  VISION: No recent vision problems, blurry vision or double vision  HEENT: No decreased hearing ear pain + nasal congestion, no  sore throat  SKIN: denies any unusual skin lesions or rashes  RESPIRATORY: denies shortness of breath, + chronic cough, no wheezing  CARDIOVASCULAR: denies chest pain on exertion, palpitations, swelling in feet  GI: denies abdominal pain and denies heartburn, nausea or vomiting  : No Pain on urination, change in the color of urine, discharge, urinating frequently  MUS: No back pain, + joint pain- all over ache , + muscle  pain-chest muscle comes and goes   NEURO: denies headaches ,+ anxiety,+ depression    EXAM:   /67 (BP Location: Left arm, Patient Position: Sitting, Cuff Size: adult)   Pulse 72   Resp 18   Ht 6' (1.829 m)   Wt 173 lb (78.5 kg)   BMI 23.46 kg/m²   GENERAL: well developed, well nourished,in no apparent distress  SKIN: no rashes,no suspicious lesions  HEENT: atraumatic, normocephalic,ears and throat are clear, no frontal maxillary sinus tenderness, pupils equal reactive to light bilaterally, extract muscles intact  NECK: supple,no adenopathy, nontender  LUNGS: clear to auscultation, no wheeze  CARDIO: RRR without murmur  GI: good BS's,no masses or tenderness  EXTREMITIES: no cyanosis, or edema    ASSESSMENT AND PLAN:   Diagnoses and all orders for this visit:    Physical exam, annual  -     Comp Metabolic Panel (14); Future  -     Lipid Panel; Future  -     Assay, Thyroid Stim Hormone; Future  -     PSA Total, Screen; Future  -     CBC, Platelet; No Differential; Future  Advised patient to watch what he eats exercise, seatbelt use no texting driving, sunscreen use advised  Advised to quit smoking-he did say that he cut down    Essential hypertension  -     lisinopril-hydroCHLOROthiazide 20-25 MG Oral Tab; Take 2 tablets by mouth daily.  Advised pt to follow a low salt , low sodium (including fast foods and processed foods), can look up DASH diet, exercise 30 min a day , monitor bp   Cont meds , log at home and bring back machine with his next visit    Colon cancer screening  -     formerly [facility] GI Telephone Colon Screen  -     GASTRO - INTERNAL  Refer  Prediabetes  -     Hemoglobin A1C; Future  Monitor, avoid/limit sugar and carbs and exercise with a goal of 30 minutes a day and can recheck    Vitreous floaters, unspecified laterality  -     OPHTHALMOLOGY - INTERNAL  Refer  Encounter for screening for malignant neoplasm of prostate  -     PSA Total, Screen; Future  Will check  Anxiety and depression  He doesn't think  he needs meds and doesn't want to speak to anyone but just stress about everthing going on including health       Preventative medicine  Labs from  2/2023 12/2023 reviewed  cscope - will refer      The patient indicates understanding of these issues and agrees to the plan.  No follow-ups on file.

## 2024-03-05 ENCOUNTER — PROCEDURE VISIT (OUTPATIENT)
Dept: NEUROLOGY | Facility: CLINIC | Age: 64
End: 2024-03-05
Payer: COMMERCIAL

## 2024-03-05 NOTE — PROCEDURES
PHYSICAL:    Cranial nerves grossly normal. Motor examination showed strength to be 5 of 5 throughout.     HISTORY:    Jonnie Leon is a 63 year old male with a complaint of numbness in the right hand as well as both feet.  Patient does admit to history of alcohol use extensively in the past    Sensory examination showed no abnormalities of pin prick or light touch, except decrease sensation in stocking distributions of both lower extremities.       TECHNICAL SUMMARY:    There were no significant technical difficulty encountered during this study.  Nerve conduction studies were performed without warming with skin temperature between 32-33 degrees centigrade.    SUMMARY:    Right peroneal and tibial motor nerves were tested.  Right sural and superficial peroneal nerves were tested.    Left sural nerve was tested    Right median and ulnar sensory nerves were tested.  Right  median and ulnar motor nerves were tested.  Right median and ulnar palmar orthodromic nerves were tested.    Summary    The motor conduction test was performed on 4 nerve(s). The results were normal in 3 nerve(s): R Median - APB, R Ulnar - ADM, R Deep peroneal (Fibular) - EDB. Results outside the specified normal range were found in 1 nerve(s), as follows:  In the R Tibial - AH study  the amplitude was reduced for Ankle stimulation  the amplitude was reduced for Knee stimulation  the amplitude was reduced for Knee stimulation    The sensory conduction test was performed on 5 nerve(s). The results were normal in 1 nerve(s): R Median - Ulnar Palmar. Findings were unremarkable in 1 nerve(s): R Median - DigII UlnarV. Results outside the specified normal range were found in 3 nerve(s), as follows:  In the R Sural - Lat Mall study  the response was considered absent for Calf stimulation  In the L Sural - Lat Mall study  the response was considered absent for Calf stimulation  In the R Superficial peroneal - Ankle study  the response was  considered absent for Lat leg stimulation    The needle EMG examination was performed in 14 muscles. It was normal in 12 muscle(s): R. Vastus medialis, R. Vastus lateralis, R. Biceps femoris (short head), R. Gastrocnemius (Medial head), R. Tibialis anterior, R. Dorsal interossei (pedis), R. Deltoid, R. Biceps brachii, R. Triceps brachii, R. Extensor digitorum communis, R. Pronator teres, R. Cervical paraspinals. The study was abnormal in 2 muscle(s), with the following distribution:  Abnormal spontaneous/insertional activity was found in R. First dorsal interosseous.  The MUP waveform abnormality was found in R. First dorsal interosseous, R. Extensor indicis proprius.             Motor NCS      Nerve / Sites Muscle Latency Amplitude Rel Amp Durat Segments Distance Lat Diff Velocity     ms mV % ms  cm ms m/s   R Median - APB      Wrist APB 4.38 11.1 100 6.15 Wrist - APB 8        Elbow APB 9.58 10.2 91.9 7.50 Elbow - Wrist 27 5.21 52   R Ulnar - ADM      Wrist ADM 3.18 9.1 100 6.67 Wrist - ADM 8        B.Elbow ADM 7.29 7.4 81.3 6.93 B.Elbow - Wrist 21 4.11 51      A.Elbow ADM 9.17 7.0 94.3 6.87 A.Elbow - B.Elbow 11.5 1.88 61       Motor NCS      Nerve / Sites Muscle Latency Amplitude Durat Segments Distance Lat Diff Velocity     ms mV ms  cm ms m/s   R Deep peroneal (Fibular) - EDB      Ankle EDB 4.53 2.6 3.39 Ankle - EDB 8        Ref.  ?6.20 ?2.0  Ref.         Fib Head EDB 14.17 1.9 6.09 Fib Head - Ankle 38 9.64 39      Ref.     Ref.   ?39      Knee EDB 16.56 1.5 6.51 Knee - Fib Head 11 2.40 46      Ref.     Ref.   ?39   R Tibial - AH      Ankle AH 5.78 1.2 3.75 Ankle - AH 8        Ref.  ?6.00 ?3.0  Ref.         Knee AH 16.56 0.5 6.09 Knee - Ankle 47 10.78 44      Ref.     Ref.   ?39       Sensory NCS      Nerve / Sites Rec. Site Onset Lat Peak Lat NP Amp PP Amp Segments Peak Diff     ms ms µV µV  ms   R Median - DigII UlnarV      Median Wrist Dig II 3.75 4.64 7.8 18.7 Median Wrist - Dig II       Ulnar Wrist Dig V  3.96 4.79 11.3 12.7 Ulnar Wrist - Median Wrist 0.16       Sensory NCS - CTS      Nerve / Sites Rec. Site Onset Lat Peak Lat NP Amp PP Amp Segments Dist Peak Diff Velocity     ms ms µV µV  cm ms m/s   R Median - Ulnar Palmar      Median Wrist 2.45 3.13 17.6 18.9 Median - Wrist 8  32.7      Ulnar Wrist 1.93 2.81 5.4 6.0 Ulnar - Median  -0.31        Sensory NCS      Nerve / Sites Rec. Site Onset Lat Peak Lat NP Amp PP Amp Segments Distance Velocity     ms ms µV µV  cm m/s   R Sural - Lat Mall      Calf Lat Mall NR NR NR NR Calf - Lat Mall 14 NR      Ref.   ?4.40 ?5.0 ?5.0 Ref.     L Sural - Lat Mall      Calf Lat Mall NR NR NR NR Calf - Lat Mall 14 NR      Ref.   ?4.40 ?5.0 ?5.0 Ref.     R Superficial peroneal - Ankle      Lat leg Ankle NR NR NR NR Lat leg - Ankle 14 NR      Ref.   ?4.20 ?5.0 ?5.0 Ref.         EMG Summary Table     Spontaneous MUAP Recruitment   Muscle Nerve Roots IA Fib PSW Fasc H.F. Comments Amp Dur. PPP Pattern   R. Vastus medialis Femoral L2-L4 N None None None None Normal N N N N   R. Vastus lateralis Femoral L2-L4 N None None None None Normal N N N N   R. Biceps femoris (short head) Sciatic (peroneal division) L5-S2 N None None None None Normal N N N N   R. Gastrocnemius (Medial head) Tibial S1-S2 N None None None None Normal N N N N   R. Tibialis anterior Deep peroneal (Fibular) L4-L5 N None None None None Normal N N N N   R. Dorsal interossei (pedis) Medial plantar S1-S2 N None None None None Normal N N N N   R. Deltoid Axillary C5-C6 N None None None None Normal N N N N   R. Biceps brachii Musculocutaneous C5-C6 N None None None None Normal N N N N   R. Triceps brachii Radial C6-C8 N None None None None Normal N N N N   R. Extensor digitorum communis Radial C7-C8 N None None None None Normal N N N N   R. Pronator teres Median C6-C7 N None None None None Normal N N N N   R. First dorsal interosseous Ulnar C8-T1 2+ 2+ 2+ None None Normal 3-5 1+ N N   R. Extensor indicis proprius Radial C7-C8 N  None None None None Normal 3-5 1+ N N   R. Cervical paraspinals Spinal C4-C8 N None None None None Normal N N N N                                CONCLUSION:    There is electrodiagnostic evidence of symmetric peripheral polyneuropathy, sensorimotor, consistent with history of alcohol use in the past.    There is also electrodiagnostic evidence of C7 and C8 radiculopathy on the right side, acute on chronic.    There is no electrodiagnostic evidence of any other mononeuropathy, plexopathy, inflammatory/necrotizing myopathy affecting right upper or right lower extremity.    CLINICAL CORRELATION:    These abnormal findings could adequately account for the patient's clinical complaints on peripheral nerve or muscle basis.    Cory Allen MD

## 2024-03-11 ENCOUNTER — LAB ENCOUNTER (OUTPATIENT)
Dept: LAB | Facility: HOSPITAL | Age: 64
End: 2024-03-11
Attending: INTERNAL MEDICINE
Payer: COMMERCIAL

## 2024-03-11 DIAGNOSIS — R73.03 PREDIABETES: ICD-10-CM

## 2024-03-11 DIAGNOSIS — Z12.5 ENCOUNTER FOR SCREENING FOR MALIGNANT NEOPLASM OF PROSTATE: ICD-10-CM

## 2024-03-11 DIAGNOSIS — Z00.00 PHYSICAL EXAM, ANNUAL: ICD-10-CM

## 2024-03-11 LAB
ALBUMIN SERPL-MCNC: 4.8 G/DL (ref 3.2–4.8)
ALBUMIN/GLOB SERPL: 1.9 {RATIO} (ref 1–2)
ALP LIVER SERPL-CCNC: 84 U/L
ALT SERPL-CCNC: 16 U/L
ANION GAP SERPL CALC-SCNC: <0 MMOL/L (ref 0–18)
AST SERPL-CCNC: 15 U/L (ref ?–34)
BILIRUB SERPL-MCNC: 0.4 MG/DL (ref 0.2–1.1)
BUN BLD-MCNC: 21 MG/DL (ref 9–23)
BUN/CREAT SERPL: 21.6 (ref 10–20)
CALCIUM BLD-MCNC: 10.2 MG/DL (ref 8.7–10.4)
CHLORIDE SERPL-SCNC: 110 MMOL/L (ref 98–112)
CHOLEST SERPL-MCNC: 131 MG/DL (ref ?–200)
CO2 SERPL-SCNC: 28 MMOL/L (ref 21–32)
COMPLEXED PSA SERPL-MCNC: 1.31 NG/ML (ref ?–4)
CREAT BLD-MCNC: 0.97 MG/DL
DEPRECATED RDW RBC AUTO: 41.9 FL (ref 35.1–46.3)
EGFRCR SERPLBLD CKD-EPI 2021: 87 ML/MIN/1.73M2 (ref 60–?)
ERYTHROCYTE [DISTWIDTH] IN BLOOD BY AUTOMATED COUNT: 12.7 % (ref 11–15)
EST. AVERAGE GLUCOSE BLD GHB EST-MCNC: 134 MG/DL (ref 68–126)
FASTING PATIENT LIPID ANSWER: YES
FASTING STATUS PATIENT QL REPORTED: YES
GLOBULIN PLAS-MCNC: 2.5 G/DL (ref 2.8–4.4)
GLUCOSE BLD-MCNC: 115 MG/DL (ref 70–99)
HBA1C MFR BLD: 6.3 % (ref ?–5.7)
HCT VFR BLD AUTO: 43.8 %
HDLC SERPL-MCNC: 52 MG/DL (ref 40–59)
HGB BLD-MCNC: 14.3 G/DL
LDLC SERPL CALC-MCNC: 59 MG/DL (ref ?–100)
MCH RBC QN AUTO: 29.2 PG (ref 26–34)
MCHC RBC AUTO-ENTMCNC: 32.6 G/DL (ref 31–37)
MCV RBC AUTO: 89.4 FL
NONHDLC SERPL-MCNC: 79 MG/DL (ref ?–130)
OSMOLALITY SERPL CALC.SUM OF ELEC: 282 MOSM/KG (ref 275–295)
PLATELET # BLD AUTO: 282 10(3)UL (ref 150–450)
POTASSIUM SERPL-SCNC: 4.3 MMOL/L (ref 3.5–5.1)
PROT SERPL-MCNC: 7.3 G/DL (ref 5.7–8.2)
RBC # BLD AUTO: 4.9 X10(6)UL
SODIUM SERPL-SCNC: 134 MMOL/L (ref 136–145)
TRIGL SERPL-MCNC: 111 MG/DL (ref 30–149)
TSI SER-ACNC: 2.12 MIU/ML (ref 0.55–4.78)
VLDLC SERPL CALC-MCNC: 16 MG/DL (ref 0–30)
WBC # BLD AUTO: 8.1 X10(3) UL (ref 4–11)

## 2024-03-11 PROCEDURE — 84443 ASSAY THYROID STIM HORMONE: CPT

## 2024-03-11 PROCEDURE — 85027 COMPLETE CBC AUTOMATED: CPT

## 2024-03-11 PROCEDURE — 80053 COMPREHEN METABOLIC PANEL: CPT

## 2024-03-11 PROCEDURE — 36415 COLL VENOUS BLD VENIPUNCTURE: CPT

## 2024-03-11 PROCEDURE — 80061 LIPID PANEL: CPT

## 2024-03-11 PROCEDURE — 83036 HEMOGLOBIN GLYCOSYLATED A1C: CPT

## 2024-03-14 ENCOUNTER — OFFICE VISIT (OUTPATIENT)
Dept: NEUROLOGY | Facility: CLINIC | Age: 64
End: 2024-03-14
Payer: COMMERCIAL

## 2024-03-14 VITALS — DIASTOLIC BLOOD PRESSURE: 77 MMHG | SYSTOLIC BLOOD PRESSURE: 143 MMHG

## 2024-03-14 DIAGNOSIS — M54.12 CERVICAL RADICULOPATHY: ICD-10-CM

## 2024-03-14 DIAGNOSIS — R20.0 RIGHT SIDED NUMBNESS: Primary | ICD-10-CM

## 2024-03-14 PROCEDURE — 99214 OFFICE O/P EST MOD 30 MIN: CPT | Performed by: OTHER

## 2024-03-14 NOTE — PROGRESS NOTES
[facility] [address], [address]  [facility] [address]  [phone]        Neurology Clinic Follow Up Note    Chief Complaint:  Numbness (LOV 12/29/23/Pt here for follow up on right sided numbness, EMG done, EEG done, labs complete/)      HPI:   Jonnie Leon  is a 63 year old w/ a pmhx sig. for prior documented history of left ulnar neuropathy, hypertension, hyperlipidemia impaired fasting glucose, current daily tobacco use with 3 prior attempts at quitting, 60% left ICA stenosis, & Stenosis of his right ICA status post carotid endarterectomy and a left MCA stroke in 2012 for which she had his right carotid endarterectomy who was referred for 3 weeks of intermittent right-sided paresthesias, which she had with his prior left MCA stroke.  On exam he does have a positive Tinel sign at his wrist and his elbow.  Most likely suspect that the symptoms in his right arm could be due to multiple compression neuropathies involving his ulnar and median nerves.  His symptoms in his right leg could be due to a peripheral neuropathy as well.  He does not have simultaneous involvement of his arm and leg, would be more concerning for stroke.  It is possible he could be having postinfarct seizures given the area involved in his 2012 stroke although this is less likely again because his arm and leg are not simultaneously involved.  Will order EEG and EMG.    Regarding his stroke from 2012; it is not clear the true mechanism was established.  It is reasonable that he had a right carotid endarterectomy given that he had 99% stenosis.  It is possible that his left MCA stroke was due to carotid stenosis.  However, if he was thought to have symptomatic carotid that with 60% left ICA stenosis he should have had an endarterectomy  Is also possible patient is undiagnosed atrial fibrillation.  Will refer him to cardiology for an implantable loop recorder.  He was on a low intensity statin but his repeat  LDL shows that it is greater than 70 and thus his statin intensity was increased.  Will start him on Chantix.  He does not have took smoking to continue to use Chantix.  Chantix will decrease the number of cigarettes he smokes which will reduce his risk of another stroke.    03/14/24 Interval History/Subjective :   Here in follow up  after his EMG.  He has not seen cardiology.    He had the EMG   He has an alcohol induced neuropathy  He also has a C7 and C8 radiculopathy, acute on chronic  Reports his symptom were gradually progressing. Stable since  lcv.  Not falling  When he tilts his head \"WAY BACK\" he gets weakness in his arms.    He is smoking less.  He has fewer cravings      ROS: Pertinent positive and negatives per HPI.  All others were reviewed and negative.     Medications:  Current Outpatient Medications   Medication Instructions    aspirin 81 mg, Oral, Daily    atorvastatin (LIPITOR) 40 mg, Oral, Nightly    clopidogrel (PLAVIX) 75 mg, Oral, Daily    lisinopril-hydroCHLOROthiazide 20-25 MG Oral Tab 2 tablets, Oral, Daily    Varenicline Tartrate, Starter, (CHANTIX STARTING MONTH PAK) 0.5 MG X 11 & 1 MG X 42 Oral Tablet Therapy Pack Take 0.5 mg by mouth daily for 3 days, THEN 0.5 mg 2 (two) times a day for 3 days, THEN 1 mg 2 (two) times a day.        Reviewed and assessed      Objective:  Last vitals and weight :  There is no height or weight on file to calculate BMI.   Vitals:    03/14/24 1317   Patient Position: Sitting   BP Location: Right arm      Blood pressure 143/77.  /77 (BP Location: Right arm, Patient Position: Sitting)   Exam:  - General: appears stated age and no distress    - Pulmonary: no signs of respiratory distress. Normal excursion of the chest.   Neurologic Exam  - Mental Status: Alert and attentive.  Oriented to. person, place, time/date, situation, day of week, month of year, and year.  Speech is spontaneous, fluent, and prosodic. Comprehension and repetition intact. Phrase  length and rate are normal. No paraphasic errors, neologisms, anomia, acalculia, apraxia, anosognosia, or R/L confusion. No neglect.   - Cranial Nerves: No gaze preference. Visual fields:normal  Pupils are equally round and reactive to light in a well room.. EOMI. No nystagmus. No ptosis. V1-V3 intact B/L to light touch.No pathological facial asymmetry. No flattening of the nasolabial fold. .  Hearing grossly intact.  Tongue midline. No atrophy or fasiculations of the tongue noted. Palate and uvula elevate symmetrically.  Shoulder shrug symmetric.  - Fundoscopic exam:normal w/o hemorrhages, exudates, or papilledema.No attenuation. No pallor.  - Motor:  normal tone, normal bulk. No interosseous wasting. No flattening of hypothenar eminences.       Right Left     Motor Strength   Deltoids 5 5  Triceps 5 5  Biceps 5 5  Wrist Extensors 5 5   5 5   Hip Flexors 5 5   Knee extensors 5 5  Knee flexors 5 5        Pronator drift: No pronator drift   Arm Rolling: No orbiting.   Finger Taps: Finger taps are symmetric in rate and amplitude.    Rapid movements: Rapid/fine movements are symmetric. As expected their dominant hand is slightly faster.   Leg Drift: None   Foot Taps: Foot taps are symmetric.      Asterixis: No asterixis noted.   Tremor:      Reflexes:    C5 C6 C7  L4 S1   R 2+ to 1+ 2+ to 1+  2+ 2+   L 2+2 3+ 2+2 3+  2+ 2+   Adductor Spread: No adductor spread noted.    Frontal release signs:Not assessed.    Jaw Jerk:    Tree's sign:absent   Nonsustained clonus: Absent   Sustained clonus: Absent   - Sensory:   Light touch: normal  Temperature: normal  Pinprick:   Vibration: Decreased in his distal right leg.  - Cerebellum: No truncal ataxia. No titubations. No dysmetria, no dysdiadochokinesis. No overshoot.   - Gait/station: Normal gait and station. Symmetric arm swing.   - Plantar response: flexor bilaterally    Most recent lab results:   Reviewed and assessed  No results found for this or any previous visit  (from the past 36 hour(s)).    Diagnostic studies:  Performed an independent visualization of mri brain  Imaging revealed:   agree w read     Assessment      He has a cervical radiculopathy. He has a cervical radiculopathy. He needs a MRI of his c spine and PT. He reports having a chronic injury to his cervical spine. I don't what this ecchymosis/bruising is. Could be d/t antiplatelets. Will see if I can determine the etiology.     Plan   1. Right sided numbness  - MRI SPINE CERVICAL (CPT=72141); Future  - Physical Therapy Referral - [place] Locations    2. Cervical radiculopathy  - MRI SPINE CERVICAL (CPT=72141); Future  - Physical Therapy Referral - [place] Locations                     This document is not intended to support charting by exception.  Sections left blank in a completed note should be presumed not to have been done.      Disclaimer:   This record was dictated using  Dragon software. There may be errors due to voice recognition problems that were not realized and corrected during the completion of the note.         Thank you for allowing me to participate in the care of your patient.    Ezequiel Dennis DO  3/14/2024

## 2024-04-30 DIAGNOSIS — Z86.73 HISTORY OF CVA (CEREBROVASCULAR ACCIDENT): ICD-10-CM

## 2024-04-30 DIAGNOSIS — I10 ESSENTIAL HYPERTENSION: ICD-10-CM

## 2024-05-01 RX ORDER — CLOPIDOGREL BISULFATE 75 MG/1
75 TABLET ORAL DAILY
Qty: 90 TABLET | Refills: 3 | OUTPATIENT
Start: 2024-05-01

## 2024-05-02 RX ORDER — LISINOPRIL AND HYDROCHLOROTHIAZIDE 25; 20 MG/1; MG/1
2 TABLET ORAL DAILY
Qty: 180 TABLET | Refills: 1 | OUTPATIENT
Start: 2024-05-02

## 2024-06-13 ENCOUNTER — OFFICE VISIT (OUTPATIENT)
Dept: NEUROLOGY | Facility: CLINIC | Age: 64
End: 2024-06-13
Payer: COMMERCIAL

## 2024-06-13 VITALS — WEIGHT: 173 LBS | BODY MASS INDEX: 23.43 KG/M2 | HEIGHT: 72 IN

## 2024-06-13 DIAGNOSIS — G45.0 VBI (VERTEBROBASILAR INSUFFICIENCY): ICD-10-CM

## 2024-06-13 DIAGNOSIS — R20.0 RIGHT LEG NUMBNESS: ICD-10-CM

## 2024-06-13 DIAGNOSIS — M54.12 CERVICAL RADICULOPATHY: Primary | ICD-10-CM

## 2024-06-13 PROCEDURE — 99214 OFFICE O/P EST MOD 30 MIN: CPT | Performed by: OTHER

## 2024-06-13 NOTE — PROGRESS NOTES
[facility] [address], [address]  [facility] [address]  [phone]        Neurology Clinic Follow Up Note    Chief Complaint:  Neuropathy (LOV 3/14/2024 Patient here today follow-up for Neuropathy, MRI and PT was not complete. Patient is c/o numbness all left side of body from shoulder to feet with dizziness, pt denies nausea, burning.  Patient is currently taking Atorvastatin 40 MG.)      HPI:   Jonnie Leon  is a 63 year old w/ a pmhx sig. for prior documented history of left ulnar neuropathy, hypertension, hyperlipidemia impaired fasting glucose, current daily tobacco use with 3 prior attempts at quitting, 60% left ICA stenosis, & Stenosis of his right ICA status post carotid endarterectomy and a left MCA stroke in 2012 for which she had his right carotid endarterectomy who was referred for 3 weeks of intermittent right-sided paresthesias, which she had with his prior left MCA stroke.  On exam he does have a positive Tinel sign at his wrist and his elbow.  Most likely suspect that the symptoms in his right arm could be due to multiple compression neuropathies involving his ulnar and median nerves.  His symptoms in his right leg could be due to a peripheral neuropathy as well.  He does not have simultaneous involvement of his arm and leg, would be more concerning for stroke.  It is possible he could be having postinfarct seizures given the area involved in his 2012 stroke although this is less likely again because his arm and leg are not simultaneously involved.  Will order EEG and EMG.    Regarding his stroke from 2012; it is not clear the true mechanism was established.  It is reasonable that he had a right carotid endarterectomy given that he had 99% stenosis.  It is possible that his left MCA stroke was due to carotid stenosis.  However, if he was thought to have symptomatic carotid that with 60% left ICA stenosis he should have had an endarterectomy  Is also  possible patient is undiagnosed atrial fibrillation.  Will refer him to cardiology for an implantable loop recorder.  He was on a low intensity statin but his repeat LDL shows that it is greater than 70 and thus his statin intensity was increased.  Will start him on Chantix.  He does not have took smoking to continue to use Chantix.  Chantix will decrease the number of cigarettes he smokes which will reduce his risk of another stroke.    06/13/24 Interval History/Subjective :     Symptoms are the same  vs. Slightly worse. No new weakness. If he has to sit in a car or a truck for a period of time the numbness in his right leg becomes severe.  He does not have neck pain,  He has stiffness and dizziness when he \"tilts his head certain ways.\"  When he tilts his head backwards he gets dizzy.    03/14/24 Interval History/Subjective :   Here in follow up  after his EMG.  He has not seen cardiology.    He had the EMG   He has an alcohol induced neuropathy  He also has a C7 and C8 radiculopathy, acute on chronic  Reports his symptom were gradually progressing. Stable since  lcv.  Not falling  When he tilts his head \"WAY BACK\" he gets weakness in his arms.    He is smoking less.  He has fewer cravings      ROS: Pertinent positive and negatives per HPI.  All others were reviewed and negative.     Medications:  Current Outpatient Medications   Medication Instructions    aspirin 81 mg, Oral, Daily    atorvastatin (LIPITOR) 40 mg, Oral, Nightly    clopidogrel (PLAVIX) 75 mg, Oral, Daily    lisinopril-hydroCHLOROthiazide 20-25 MG Oral Tab 2 tablets, Oral, Daily    Varenicline Tartrate, Starter, (CHANTIX STARTING MONTH PAK) 0.5 MG X 11 & 1 MG X 42 Oral Tablet Therapy Pack Take 0.5 mg by mouth daily for 3 days, THEN 0.5 mg 2 (two) times a day for 3 days, THEN 1 mg 2 (two) times a day.        Reviewed and assessed      Objective:  Last vitals and weight :  Body mass index is 23.46 kg/m².   There were no vitals filed for this visit.      Height 72\", weight 173 lb (78.5 kg).  Ht 72\"   Wt 173 lb (78.5 kg)   BMI 23.46 kg/m²   Exam:  - General: appears stated age and no distress    - Pulmonary: no signs of respiratory distress. Normal excursion of the chest.   Neurologic Exam  - Mental Status: Alert and attentive.  Oriented to. person, place, time/date, situation, day of week, month of year, and year.  Speech is spontaneous, fluent, and prosodic. Comprehension and repetition intact. Phrase length and rate are normal. No paraphasic errors, neologisms, anomia, acalculia, apraxia, anosognosia, or R/L confusion. No neglect.   - Cranial Nerves: No gaze preference. Visual fields:normal  Pupils are equally round and reactive to light in a well room.. EOMI. No nystagmus. No ptosis. V1-V3 intact B/L to light touch.No pathological facial asymmetry. No flattening of the nasolabial fold. .  Hearing grossly intact.  Tongue midline. No atrophy or fasiculations of the tongue noted. Palate and uvula elevate symmetrically.  Shoulder shrug symmetric.  - Fundoscopic exam:normal w/o hemorrhages, exudates, or papilledema.No attenuation. No pallor.  - Motor:  normal tone, normal bulk. No interosseous wasting. No flattening of hypothenar eminences.       Right Left     Motor Strength   Deltoids 5 5  Triceps 5 5  Biceps 5 5  Wrist Extensors 5 5   5 5   Hip Flexors 5 5   Knee extensors 5 5  Knee flexors 5 5        Pronator drift: No pronator drift   Arm Rolling: No orbiting.   Finger Taps: Finger taps are symmetric in rate and amplitude.    Rapid movements: Rapid/fine movements are symmetric. As expected their dominant hand is slightly faster.   Leg Drift: None   Foot Taps: Foot taps are symmetric.      Asterixis: No asterixis noted.   Tremor:      Reflexes:    C5 C6 C7  L4 S1   R 2+ to 1+ 2+ to 1+  2+ 2+   L 2+2 3+ 2+2 3+  2+ 2+   Adductor Spread: No adductor spread noted.    Frontal release signs:Not assessed.    Jaw Jerk:    Tree's sign:absent   Nonsustained  clonus: Absent   Sustained clonus: Absent   - Sensory:   Light touch: normal  Temperature: normal  Pinprick:   Vibration: Decreased in his distal right leg.  - Cerebellum: No truncal ataxia. No titubations. No dysmetria, no dysdiadochokinesis. No overshoot.   - Gait/station: Normal gait and station. Symmetric arm swing.   - Plantar response: flexor bilaterally    Most recent lab results:   Reviewed and assessed  No results found for this or any previous visit (from the past 36 hour(s)).    Diagnostic studies:  Performed an independent visualization of mri brain  Imaging revealed:   agree w read     Assessment      He has a cervical radiculopathy. He needs a MRI of his c spine and PT. He reports having a chronic injury to his cervical spine.       Plan   1. Cervical radiculopathy  - MRI SPINE CERVICAL (CPT=72141); Future  - Neurosurgery Referral - In Network    2. VBI (vertebrobasilar insufficiency)  - Neurosurgery Referral - In Network    3. Right leg numbness  - MRI SPINE LUMBAR (CPT=72148); Future                       This document is not intended to support charting by exception.  Sections left blank in a completed note should be presumed not to have been done.      Disclaimer:   This record was dictated using  Dragon software. There may be errors due to voice recognition problems that were not realized and corrected during the completion of the note.         Thank you for allowing me to participate in the care of your patient.    Ezequiel Dennis DO  06/13/24

## 2024-06-21 ENCOUNTER — MED REC SCAN ONLY (OUTPATIENT)
Dept: NEUROLOGY | Facility: CLINIC | Age: 64
End: 2024-06-21

## 2024-06-21 ENCOUNTER — TELEPHONE (OUTPATIENT)
Dept: NEUROLOGY | Facility: CLINIC | Age: 64
End: 2024-06-21

## 2024-06-21 NOTE — TELEPHONE ENCOUNTER
MRI Lumbar & Cervical Spine reports received from [facility] ([phone])     Exam date 6/19/24    Report placed on Dr Dennis desk . Copy sent to scanning

## 2024-06-22 DIAGNOSIS — M48.02 CERVICAL STENOSIS OF SPINAL CANAL: Primary | ICD-10-CM

## 2024-06-22 NOTE — PROGRESS NOTES
Brief neurology progress note    .  Reviewed outside report of MRI from [facility].  He needs to see neurosurgery.      The impression from the report from [facility]  Impression: 1.  At C6-C7 broad-based posterior disc protrusion measuring 3 to 4 mm AP superimposed on diffuse posterior osseous hypertrophy contribute to mild to moderate central canal stenosis.  Moderate to severe bilateral foraminal stenosis.  2.  C7-T1 broad-based central posterior disc protrusion measuring 2.5 mm AP contributing to mild central canal stenosis.  Mild bilateral neuroforaminal narrowing.  3.  C5-C6 post broad-based right paracentral disc protrusion measuring 2.5 mm AP contributing to mild right lateral recess narrowing and minimal central canal stenosis.  Mild right and minimal left foraminal encroachment.    Patient has significant symptoms.  He please see last clinic note.  He still reports he is symptomatic if he flexes the neck backwards.  Will refer him again to neurosurgery.

## 2024-06-24 ENCOUNTER — TELEPHONE (OUTPATIENT)
Dept: NEUROLOGY | Facility: CLINIC | Age: 64
End: 2024-06-24

## 2024-06-24 ENCOUNTER — TELEPHONE (OUTPATIENT)
Dept: SURGERY | Facility: CLINIC | Age: 64
End: 2024-06-24

## 2024-06-24 NOTE — TELEPHONE ENCOUNTER
Ezequiel Dennis DO [facility] Nurse; Renee Cavanaugh RN  Hi,  Can you please call patient make sure that he is seen by neurosurgery.  I got the results of his MRI from [facility].  Please call him and tell that he needs to bring the disc sent from [facility] so that neurosurgery can review them.  Thank you

## 2024-06-24 NOTE — TELEPHONE ENCOUNTER
Patient came in office today and dropped off disc for download. Disc was downloaded via PAC's.     MRI Cervical Spine W/O Contrast - 6/19/24  MRI Lumbar Spine W/O Contrast - 6/19/24

## 2024-07-01 ENCOUNTER — OFFICE VISIT (OUTPATIENT)
Dept: SURGERY | Facility: CLINIC | Age: 64
End: 2024-07-01
Payer: COMMERCIAL

## 2024-07-01 VITALS
BODY MASS INDEX: 23.03 KG/M2 | HEART RATE: 57 BPM | DIASTOLIC BLOOD PRESSURE: 77 MMHG | WEIGHT: 170 LBS | SYSTOLIC BLOOD PRESSURE: 143 MMHG | HEIGHT: 72 IN

## 2024-07-01 DIAGNOSIS — R26.89 BALANCE DISORDER: ICD-10-CM

## 2024-07-01 DIAGNOSIS — M47.812 CERVICAL SPONDYLOSIS: ICD-10-CM

## 2024-07-01 DIAGNOSIS — M47.816 LUMBAR SPONDYLOSIS: ICD-10-CM

## 2024-07-01 DIAGNOSIS — M54.2 CERVICALGIA: ICD-10-CM

## 2024-07-01 DIAGNOSIS — I69.398 SPASTICITY DUE TO OLD STROKE: ICD-10-CM

## 2024-07-01 DIAGNOSIS — R25.2 SPASTICITY DUE TO OLD STROKE: ICD-10-CM

## 2024-07-01 DIAGNOSIS — R20.0 RIGHT SIDED NUMBNESS: ICD-10-CM

## 2024-07-01 DIAGNOSIS — M48.02 CERVICAL STENOSIS OF SPINAL CANAL: Primary | ICD-10-CM

## 2024-07-01 PROCEDURE — 99205 OFFICE O/P NEW HI 60 MIN: CPT | Performed by: STUDENT IN AN ORGANIZED HEALTH CARE EDUCATION/TRAINING PROGRAM

## 2024-07-01 RX ORDER — ATORVASTATIN CALCIUM 40 MG/1
40 TABLET, FILM COATED ORAL NIGHTLY
COMMUNITY
Start: 2024-03-01

## 2024-07-01 NOTE — H&P
[facility]  Neurological Surgery New Patient Clinic Note    Jonnie Leon  11/29/1960  [id number]  PCP: Leigh Pace MD  Referring Provider: Ezequiel Dennis DO    REASON FOR VISIT:  Right hemibody spasms, numbness    HISTORY OF PRESENT ILLNESS:  Jonnie Leon is a(n) 63 year old male with hypertension, hyperlipidemia, previous CVA with residual right-sided weakness and numbness who presents for evaluation.  He reports a left hemispheric stroke almost 10 years ago affecting the right side of his body.  He is status post right CVA and is on aspirin and Plavix.  He has stable left-sided carotid disease, approximately 50% stenosis.  Recently over the last year or so he started developing cramping involving the right hemibody.  He denies any symptoms of the left side.  This cramping is often associated with numbness, which is intermittent.  Seems to be worse with sitting.  He is often woken up in the melanite because of these symptoms.  He specifically denies dexterity loss, balance difficulties, frequent falls, significant neck pain, radicular type pain in any specific dermatomal distribution.  His numbness affects the entire right arm and right leg and no particular dermatomal pattern.  He does complain of positional lightheadedness triggered by extension of the neck.     PAST MEDICAL HISTORY:  Past Medical History:    Acute, but ill-defined, cerebrovascular disease    Anxiety    Anxiety state    Calculus of kidney    Dehydration    Depression    Essential hypertension    Gastroenteritis    High blood pressure    High cholesterol    Lipid screening    Per [facility]    Muscle weakness    right sided weakness after CVA    Stroke (HCC)    right side weakness    Visual impairment     PAST SURGICAL HISTORY:  Past Surgical History:   Procedure Laterality Date    Arthroscopy of joint unlisted Right knee    Back surgery  1991    Colonoscopy  07/09/2010    Per [facility]     Cystoscopy,insert ureteral stent Right 03/09/2021    Dr. Moss @ [facility]    Cystouretro w/stone remove Right 03/26/2021    Dr. Moss @ [facility]    Musculoskeletal surgery unlisted Right 2008    Right shoulder surgery    Other surgical history  knee,shoulder,neck    Tonsillectomy  child     FAMILY HISTORY:  family history includes Cancer (age of onset: 54) in his sister; Colon Cancer (age of onset: 72) in his mother; Diabetes in his mother; No Known Problems in his father.    SOCIAL HISTORY:   reports that he has been smoking cigarettes. He has a 22.5 pack-year smoking history. He has never used smokeless tobacco. He reports current alcohol use of about 2.0 standard drinks of alcohol per week. He reports current drug use. Frequency: 1.00 time per week. Drug: Cannabis.    ALLERGIES:  No Known Allergies    MEDICATIONS:  Current Outpatient Medications on File Prior to Visit   Medication Sig Dispense Refill    atorvastatin 40 MG Oral Tab Take 1 tablet (40 mg total) by mouth nightly.      lisinopril-hydroCHLOROthiazide 20-25 MG Oral Tab Take 2 tablets by mouth daily. 180 tablet 1    aspirin 81 MG Oral Tab EC Take 1 tablet (81 mg total) by mouth daily. 90 tablet 3    clopidogrel 75 MG Oral Tab Take 1 tablet (75 mg total) by mouth daily. 90 tablet 3     No current facility-administered medications on file prior to visit.     REVIEW OF SYSTEMS:  All other systems were reviewed and were negative except for those previously mentioned in the HPI    PHYSICAL EXAMINATION:  General: No acute distress.  Respiratory: Non-labored respirations bilaterally. No audible wheezing  Cardiovascular: Extremities warm and well-perfused.  Abdomen: Soft, nontender, nondistended.   Musculoskeletal: Moves all extremities well, symmetrically.  Extremities: No edema.    NEUROLOGIC EXAMINATION:  Mental status: Alert and oriented x 3  Speech: Clear, fluent  Cranial nerves: PERRLA, EOMI, face symmetric, with normal strength and sensation, tongue and  palate midline, SCM 5/5 bilaterally  Motor:     RIGHT  Delt 5/5   Bic 4+/5  Tri 4+/5   HI 4+/5    4+/5  IP 4+/5   Quad 4+/5   Ham 4+/5   AT 4+/5   EHL 4+/5 Maria L 4+/5     LEFT    Delt 4/5 *ROM/pain limited   Bic 5/5  Tri 5/5   HI 5/5    5/5  IP 5/5   Quad 5/5   Ham 5/5   AT 5/5   EHL 5/5 Maria L 5/5   No pronator drift  Tone: Normal  Atrophy/Fasciculations: None  Sensation: Slight decrease in right hemibody  Cerebellar: Normal finger nose finger  Gait: Normal, nondistressed heel toe tandem gait      Reflexes: 3+ throughout, symmetric, no Tree's    IMAGING:  MRI brain 12/28/2023: No acute intracranial pathology.  Chronic left MCA infarct involving the left parietal lobe, with slight extension into the posterior frontal lobe.  CTA head and neck 12/28/2023: No hemodynamically significant stenosis, or evidence of vascular lesions.  Postsurgical changes related to prior right CEA without evidence of restenosis.  MR cervical 6/19/2024: Diffuse spondylotic changes throughout the cervical spine, most prominent at C6-7 where there is a disc osteophyte complex leading to moderate spinal stenosis with no active spinal cord compression and no cord signal change.  MR lumbar 6/19/2024: Diffuse spondylotic changes throughout the lumbar spine, most prominently spanning L3-S1 with near complete height loss of L5-S1 due to disc degeneration, there are bulges at L3 levels.  However, most significance is the moderate stenosis at L3-4 leading to moderate central canal, bilateral foraminal, and lateral recess stenosis.    ASSESSMENT:  Mr. Leon presents for evaluation of right hemibody symptoms which seem consistent with worsening spasticity. This would be referable to his imaging findings demonstrating a chronic left frontal parietal stroke in great proximity to the sensorimotor cortex.  I independently reviewed his cervical and lumbar imaging.  While there are some changes due to degenerative spondylosis, none of these  would explain his symptomatology at least not in the neutral position.  Of note, he does have worsening numbness and changes to his sensation during neck extension.  Therefore, I suggested that we obtain a cervical MRI with flexion-extension protocol to rule out dynamic spinal cord compression.  If negative, I believe the most obvious explanation for her symptoms would be spasticity due to his old stroke.  He may benefit from a trial of baclofen.  He will return to see me after his imaging is completed.    Plan:  -MR cervical flexion-extension protocol  -RTC after MRI completed    Major Arroyo MD  Neurological Surgery    [address], [address]  [place] [address]  [phone]  Pager [phone]  7/1/2024 8:48 AM      This note was created using a voice-recognition transcribing system. Incorrect words or phrases may have been missed during proofreading. Please interpret accordingly.    Total Time    New Patient Total Time       60  minutes.      Activities       Preparing to see the patient (chart/tests/imaging review).       Obtaining and/or reviewing separately obtained history.       Performing a medically appropriate examination and/or evaluation.       Counseling and educating the patient/family/caregiver.       Ordering medications, tests, or procedures.       Referring and communicating with other health care professionals (when not separately reported).       Documenting clincal information in the electronic or other health record.       Independently interpreting results (not separately reported).    Communicating results to the patient/family/caregiver.    Care coordination (not separately reported).

## 2024-07-01 NOTE — PROGRESS NOTES
Most recent imaging dated on: 6/2024 via PAC's.  Pain Level: 1/10. Patient states that they are having pain located on their neck.   Numbness / Tingling: Patient reports numbness and tingling on their right elbow that radiates to to right hand and on right hip that radiates down to right leg.   Physical Therapy / Injections: Patient denies completing any recent Physical Therapy / Injections.

## 2024-07-01 NOTE — PATIENT INSTRUCTIONS
Refill policies:    Allow 2-3 business days for refills; controlled substances may take longer.  Contact your pharmacy at least 5 days prior to running out of medication and have them send an electronic request or submit request through the “request refill” option in your [employer] account.  Refills are not addressed on weekends; covering physicians do not authorize routine medications on weekends.  No narcotics or controlled substances are refilled after noon on Fridays or by on call physicians.  By law, narcotics must be electronically prescribed.  A 30 day supply with no refills is the maximum allowed.  If your prescription is due for a refill, you may be due for a follow up appointment.  To best provide you care, patients receiving routine medications need to be seen at least once a year.  Patients receiving narcotic/controlled substance medications need to be seen at least once every 3 months.  In the event that your preferred pharmacy does not have the requested medication in stock (e.g. Backordered), it is your responsibility to find another pharmacy that has the requested medication available.  We will gladly send a new prescription to that pharmacy at your request.    Scheduling Tests:    If your physician has ordered radiology tests such as MRI or CT scans, please contact Central Scheduling at [phone] right away to schedule the test.  Once scheduled, the [facility] Centralized Referral Team will work with your insurance carrier to obtain pre-certification or prior authorization.  Depending on your insurance carrier, approval may take 3-10 days.  It is highly recommended patients assure they have received an authorization before having a test performed.  If test is done without insurance authorization, patient may be responsible for the entire amount billed.      Precertification and Prior Authorizations:  If your physician has recommended that you have a procedure or additional testing performed the [facility] hospital  Centralized Referral Team will contact your insurance carrier to obtain pre-certification or prior authorization.    You are strongly encouraged to contact your insurance carrier to verify that your procedure/test has been approved and is a COVERED benefit.  Although the [facility] Centralized Referral Team does its due diligence, the insurance carrier gives the disclaimer that \"Although the procedure is authorized, this does not guarantee payment.\"    Ultimately the patient is responsible for payment.   Thank you for your understanding in this matter.  Paperwork Completion:  If you require FMLA or disability paperwork for your recovery, please make sure to either drop it off or have it faxed to our office at [phone]. Be sure the form has your name and date of birth on it.  The form will be faxed to our Forms Department and they will complete it for you.  There is a 25$ fee for all forms that need to be filled out.  Please be aware there is a 10-14 day turnaround time.  You will need to sign a release of information (JOSE RAMON) form if your paperwork does not come with one.  You may call the Forms Department with any questions at [phone].  Their fax number is [phone].

## 2024-08-01 ENCOUNTER — NURSE TRIAGE (OUTPATIENT)
Dept: INTERNAL MEDICINE CLINIC | Facility: CLINIC | Age: 64
End: 2024-08-01

## 2024-08-01 DIAGNOSIS — I10 ESSENTIAL HYPERTENSION: ICD-10-CM

## 2024-08-01 RX ORDER — LISINOPRIL AND HYDROCHLOROTHIAZIDE 25; 20 MG/1; MG/1
2 TABLET ORAL DAILY
Qty: 180 TABLET | Refills: 3 | Status: SHIPPED | OUTPATIENT
Start: 2024-08-01

## 2024-08-01 NOTE — TELEPHONE ENCOUNTER
Action Requested: Summary for Provider     []  Critical Lab, Recommendations Needed  [] Need Additional Advice  [x]   FYI    []   Need Orders  [] Need Medications Sent to Pharmacy  []  Other     SUMMARY: Per protocol disposition advised Go to emergency department/[facility]. Based on history/symptoms emergency department is recommended. Wife states she will drive to patient's work now to let him know recommendations and urge him to go to the emergency department now.    Dr. Leigh Pace--FYI    Triage RN, 24 Emergency Department follow up. Thank you.    Reason for call: Dizziness  Onset: 2 months ago    Spoke to patient's wife (name and  of patient verified). She states patient is not available or able to answer phone as he is working right now. Not able to answer personal phone.  She reports Lisinopril-hydrochlorothiazide was increased in February  Last 2 months has had dizzy spells  They occurred 2-3 x times yesterday   History of previous stroke with weakness on right side of body -- worse than it was before; unsure how long it has been worse for  Wife believes patient may have had bloody stool, but thinks it resolved.    Reason for Disposition   Spinning or tilting sensation (vertigo) present now and one or more stroke risk factors (i.e., hypertension, diabetes mellitus, prior stroke/TIA, heart attack, age over 60) (Exception: Prior physician evaluation for this AND no different/worse than usual.)    Protocols used: Dizziness-A-OH

## 2024-08-01 NOTE — TELEPHONE ENCOUNTER
Please review. Protocol Failed; No Protocol    Requested Prescriptions   Pending Prescriptions Disp Refills    lisinopril-hydroCHLOROthiazide 20-25 MG Oral Tab 180 tablet 1     Sig: Take 2 tablets by mouth daily.       Hypertension Medications Protocol Failed - 8/1/2024 12:06 PM        Failed - Last BP reading less than 140/90     BP Readings from Last 1 Encounters:   07/01/24 143/77               Passed - CMP or BMP in past 12 months        Passed - In person appointment or virtual visit in the past 12 mos or appointment in next 3 mos     Recent Outpatient Visits              1 month ago [facility]ervical stenosis [facility] spinal canal    [facility]    [facility]    1 month ago Cervical radiculopathy    [facility]    [facility] Visit    4 months ago Right sided numbness    [facility]    [facility] Visit    5 months ago Physical exam, annual    [facility]    [facility]    7 months ago Right sided numbness    [facility]    [facility]          Future Appointments         Provider Department Appt Notes    In 2 weeks [facility] MRI 1 (1.5T WIDE) [facility] - [place]                     Passed - EGFRCR or GFRNAA > 50     GFR Evaluation  EGFRCR: 87 , resulted on 3/11/2024                 Future Appointments         Provider Department Appt Notes    In 2 weeks [facility] MRI RM1 (1.5T WIDE) [facility] [place]           Recent Outpatient Visits              1 month ago Cervical stenosis of spinal canal    [facility]    Office Visit    1 month ago Cervical radiculopathy    [facility]    [facility] Visit    4  months ago Right sided numbness    [facility], Ezequiel Mayers DO    Office Visit    5 months ago Physical exam, annual    [facility], Leigh Jones MD    Office Visit    7 months ago Right sided numbness    [facility], Ezequiel Mayers DO    Office Visit

## 2024-08-01 NOTE — TELEPHONE ENCOUNTER
Refill team, please review. Thank you.    Spoke to wife (name and  of patient verified). She reports patient is out of medication, needs refill.

## 2024-08-02 NOTE — TELEPHONE ENCOUNTER
NO ED encounter, nothing under [facility].  RN called the patient,  Date of birth and full name both confirmed.     States  he is asymptomatic, they went to ED EM  last night due to his son's symptoms and not his.   He is seeing the neurologist for his symptoms.   Patient would like to schedule for an annual physical, warm transferred the call to the appointment desk for assistance .       Future Appointments   Date Time Provider Department Center   8/21/2024  2:15 PM [facility] MRI 1 (1.5T WIDE) [facility] [place]   10/29/2024 10:20 AM Leigh Pace MD [facility]

## 2024-08-21 ENCOUNTER — HOSPITAL ENCOUNTER (OUTPATIENT)
Dept: MRI IMAGING | Age: 64
Discharge: HOME | End: 2024-08-21
Attending: STUDENT IN AN ORGANIZED HEALTH CARE EDUCATION/TRAINING PROGRAM
Payer: COMMERCIAL

## 2024-08-21 DIAGNOSIS — M54.2 CERVICALGIA: ICD-10-CM

## 2024-08-21 DIAGNOSIS — R26.89 BALANCE DISORDER: ICD-10-CM

## 2024-08-21 DIAGNOSIS — R25.2 SPASTICITY DUE TO OLD STROKE: ICD-10-CM

## 2024-08-21 DIAGNOSIS — M47.816 LUMBAR SPONDYLOSIS: ICD-10-CM

## 2024-08-21 DIAGNOSIS — I69.398 SPASTICITY DUE TO OLD STROKE: ICD-10-CM

## 2024-08-21 DIAGNOSIS — M48.02 CERVICAL STENOSIS OF SPINAL CANAL: ICD-10-CM

## 2024-08-21 DIAGNOSIS — R20.0 RIGHT SIDED NUMBNESS: ICD-10-CM

## 2024-08-21 DIAGNOSIS — M47.812 CERVICAL SPONDYLOSIS: ICD-10-CM

## 2024-08-21 PROCEDURE — 72141 MRI NECK SPINE W/O DYE: CPT | Performed by: STUDENT IN AN ORGANIZED HEALTH CARE EDUCATION/TRAINING PROGRAM

## 2024-10-29 ENCOUNTER — OFFICE VISIT (OUTPATIENT)
Dept: INTERNAL MEDICINE CLINIC | Facility: CLINIC | Age: 64
End: 2024-10-29
Payer: COMMERCIAL

## 2024-10-29 VITALS
BODY MASS INDEX: 23.3 KG/M2 | SYSTOLIC BLOOD PRESSURE: 127 MMHG | HEIGHT: 72 IN | WEIGHT: 172 LBS | DIASTOLIC BLOOD PRESSURE: 74 MMHG | HEART RATE: 66 BPM

## 2024-10-29 DIAGNOSIS — F17.200 TOBACCO DEPENDENCE: ICD-10-CM

## 2024-10-29 DIAGNOSIS — Z23 NEED FOR VACCINATION: ICD-10-CM

## 2024-10-29 DIAGNOSIS — I10 ESSENTIAL HYPERTENSION: Primary | ICD-10-CM

## 2024-10-29 DIAGNOSIS — R73.03 PREDIABETES: ICD-10-CM

## 2024-10-29 DIAGNOSIS — Z12.11 COLON CANCER SCREENING: ICD-10-CM

## 2024-10-29 DIAGNOSIS — E78.00 HYPERCHOLESTEROLEMIA: ICD-10-CM

## 2024-10-29 PROCEDURE — 90472 IMMUNIZATION ADMIN EACH ADD: CPT | Performed by: INTERNAL MEDICINE

## 2024-10-29 PROCEDURE — 90715 TDAP VACCINE 7 YRS/> IM: CPT | Performed by: INTERNAL MEDICINE

## 2024-10-29 PROCEDURE — 90656 IIV3 VACC NO PRSV 0.5 ML IM: CPT | Performed by: INTERNAL MEDICINE

## 2024-10-29 PROCEDURE — 90471 IMMUNIZATION ADMIN: CPT | Performed by: INTERNAL MEDICINE

## 2024-10-29 PROCEDURE — 99214 OFFICE O/P EST MOD 30 MIN: CPT | Performed by: INTERNAL MEDICINE

## 2024-10-29 RX ORDER — ATORVASTATIN CALCIUM 40 MG/1
40 TABLET, FILM COATED ORAL NIGHTLY
Qty: 90 TABLET | Refills: 3 | Status: SHIPPED | OUTPATIENT
Start: 2024-10-29

## 2024-10-29 RX ORDER — LISINOPRIL AND HYDROCHLOROTHIAZIDE 20; 25 MG/1; MG/1
2 TABLET ORAL DAILY
Qty: 180 TABLET | Refills: 3 | Status: CANCELLED | OUTPATIENT
Start: 2024-10-29

## 2024-10-29 RX ORDER — LISINOPRIL AND HYDROCHLOROTHIAZIDE 20; 25 MG/1; MG/1
2 TABLET ORAL DAILY
Qty: 180 TABLET | Refills: 3 | Status: SHIPPED | OUTPATIENT
Start: 2024-10-29

## 2024-10-29 NOTE — PROGRESS NOTES
Jonnie Leon is a 63 year old male.  Chief Complaint   Patient presents with    Medication Follow-Up     Clarification on cholesterol medication        HPI:   Patient comes for follow-up  C/C follow-up  C/show since her last visit he did see the neurosurgeon and was recommended no surgery and that he also needs refills          HISTORY  2/2024  numbness and tingling on the fingers and toes , noted he was seen for this in dec   Saw neuro and the atorvastatin was increased to 40 mg  Already had a pot of coffee              HISTORY   11/4/2019 first visit  new pt   C/c htn   C/o lost his job at the [employer] feb 2019 and now working as a  for "[employer]"   Needs refills  Does not watch his diet and likes to add salt to his food, eats popcorn  \" I salt everything\"        Lives with wife and son   Works for [employer] other parts     PMH  htn  HL  cvs in 2012 with right sided weakness and with some right-sided numbness is residual  Right VAS s/p carotid endarterectomy dr sharma   Anxiety - on bupropion   Depression -used to see  Dr.Christina Fontana in in [facility]-- h/o hospitalization for suicidal ideation.  tob dep   Right ureteral stone 3/2021   h/o covid may 2022  PREDIABETES   Tob dep- used to smoke 1/2ppd since teenager     Current Outpatient Medications   Medication Sig Dispense Refill    lisinopril-hydroCHLOROthiazide 20-25 MG Oral Tab Take 2 tablets by mouth daily. 180 tablet 3    atorvastatin 40 MG Oral Tab Take 1 tablet (40 mg total) by mouth nightly. 90 tablet 3    aspirin 81 MG Oral Tab EC Take 1 tablet (81 mg total) by mouth daily. 90 tablet 3    clopidogrel 75 MG Oral Tab Take 1 tablet (75 mg total) by mouth daily. 90 tablet 3      Past Medical History:    Acute, but ill-defined, cerebrovascular disease    Anxiety    Anxiety state    Calculus of kidney    Dehydration    Depression    Essential hypertension    Gastroenteritis    High blood pressure    High cholesterol    Lipid screening     Per [facility]    Muscle weakness    right sided weakness after CVA    Stroke (HCC)    right side weakness    Visual impairment      Past Surgical History:   Procedure Laterality Date    Arthroscopy of joint unlisted Right knee    Back surgery  1991    Colonoscopy  07/09/2010    Per [facility]    Cystoscopy,insert ureteral stent Right 03/09/2021    Dr. Moss @ [facility]    Cystouretro w/stone remove Right 03/26/2021    Dr. Moss @ [facility]    Musculoskeletal surgery unlisted Right 2008    Right shoulder surgery    Other surgical history  knee,shoulder,neck    Tonsillectomy  child      Social History:  Social History     Socioeconomic History    Marital status:    Tobacco Use    Smoking status: Every Day     Current packs/day: 0.50     Average packs/day: 0.5 packs/day for 45.0 years (22.5 ttl pk-yrs)     Types: Cigarettes    Smokeless tobacco: Never   Vaping Use    Vaping status: Former   Substance and Sexual Activity    Alcohol use: Yes     Alcohol/week: 2.0 standard drinks of alcohol     Types: 2 Cans of beer per week     Comment: last drink a week ago    Drug use: Yes     Frequency: 1.0 times per week     Types: Cannabis   Other Topics Concern    Caffeine Concern Yes     Comment: Coffee, > 6 cups daily     Social Drivers of Health     Physical Activity: Unknown (1/9/2020)    [place]    Exercise Vital Sign     Days of Exercise per Week: Patient declined     Minutes of Exercise per Session: Patient declined        REVIEW OF SYSTEMS:   GENERAL HEALTH: No fevers, chills, sweats, fatigue  VISION: No recent vision problems, blurry vision or double vision  RESPIRATORY: denies shortness of breath, cough, wheezing  CARDIOVASCULAR: denies chest pain on exertion, palpitations, swelling in feet  NEURO: denies headaches , anxiety, depression    EXAM:   /74   Pulse 66   Ht 6' (1.829 m)   Wt 172 lb (78 kg)   BMI 23.33 kg/m²   GENERAL: well developed, well nourished,in no  apparent distress  SKIN: no rashes,no suspicious lesions  HEENT: atraumatic, normocephalic   NECK: supple,no adenopathy  LUNGS: clear to auscultation, no wheeze  CARDIO: RRR    EXTREMITIES: no cyanosis, or edema    ASSESSMENT AND PLAN:   Diagnoses and all orders for this visit:    Essential hypertension  -     lisinopril-hydroCHLOROthiazide 20-25 MG Oral Tab; Take 2 tablets by mouth daily.  -     Comp Metabolic Panel (14); Future  -     Hemoglobin A1C; Future  -     Lipid Panel; Future  -     TSH W Reflex To Free T4; Future  -     CBC, Platelet; No Differential; Future  Advised pt to follow a low salt , low sodium (including fast foods and processed foods), can look up DASH diet, exercise 30 min a day , monitor bp     Need for vaccination  -     Fluzone trivalent vaccine, PF 0.5mL, 6mo+ (63909)  -     TETANUS, DIPHTHERIA TOXOIDS AND ACELLULAR PERTUSIS VACCINE (TDAP), >7 YEARS, IM USE  Today   Hypercholesterolemia  -     atorvastatin 40 MG Oral Tab; Take 1 tablet (40 mg total) by mouth nightly.  -     Comp Metabolic Panel (14); Future  -     Hemoglobin A1C; Future  -     Lipid Panel; Future  -     TSH W Reflex To Free T4; Future  -     CBC, Platelet; No Differential; Future  Follow low-fat low-cholesterol diet and exercise with a goal of 30 minutes a day, continue medications-refilled  Prediabetes  -     Hemoglobin A1C; Future  Follow low-carb low sugar diet and exercise with a goal of 30 minutes a day, monitor A1c  Colon cancer screening  -     [facility] Telephone Colon Screen  Refer  Tobacco dependence  -     CT LUNG LD SCREENING(CPT=71271); Future  Ordered      Preventative medicine  Labs 3/2024 12/2023  reviewed  cscope - will refer      The patient indicates understanding of these issues and agrees to the plan.  No follow-ups on file.

## 2024-11-01 ENCOUNTER — LAB ENCOUNTER (OUTPATIENT)
Dept: LAB | Facility: HOSPITAL | Age: 64
End: 2024-11-01
Attending: INTERNAL MEDICINE
Payer: COMMERCIAL

## 2024-11-01 ENCOUNTER — HOSPITAL ENCOUNTER (OUTPATIENT)
Dept: CT IMAGING | Facility: HOSPITAL | Age: 64
Discharge: HOME | End: 2024-11-01
Attending: INTERNAL MEDICINE
Payer: COMMERCIAL

## 2024-11-01 DIAGNOSIS — I10 ESSENTIAL HYPERTENSION: ICD-10-CM

## 2024-11-01 DIAGNOSIS — F17.200 TOBACCO DEPENDENCE: ICD-10-CM

## 2024-11-01 DIAGNOSIS — R73.03 PREDIABETES: ICD-10-CM

## 2024-11-01 DIAGNOSIS — E78.00 HYPERCHOLESTEROLEMIA: ICD-10-CM

## 2024-11-01 LAB
ALBUMIN SERPL-MCNC: 4.9 G/DL (ref 3.2–4.8)
ALBUMIN/GLOB SERPL: 1.8 {RATIO} (ref 1–2)
ALP LIVER SERPL-CCNC: 85 U/L
ALT SERPL-CCNC: 16 U/L
ANION GAP SERPL CALC-SCNC: 8 MMOL/L (ref 0–18)
AST SERPL-CCNC: 25 U/L (ref ?–34)
BILIRUB SERPL-MCNC: 0.5 MG/DL (ref 0.2–1.1)
BUN BLD-MCNC: 14 MG/DL (ref 9–23)
BUN/CREAT SERPL: 13.1 (ref 10–20)
CALCIUM BLD-MCNC: 9.7 MG/DL (ref 8.7–10.4)
CHLORIDE SERPL-SCNC: 104 MMOL/L (ref 98–112)
CHOLEST SERPL-MCNC: 129 MG/DL (ref ?–200)
CO2 SERPL-SCNC: 26 MMOL/L (ref 21–32)
CREAT BLD-MCNC: 1.07 MG/DL
DEPRECATED RDW RBC AUTO: 41.2 FL (ref 35.1–46.3)
EGFRCR SERPLBLD CKD-EPI 2021: 77 ML/MIN/1.73M2 (ref 60–?)
ERYTHROCYTE [DISTWIDTH] IN BLOOD BY AUTOMATED COUNT: 12.4 % (ref 11–15)
EST. AVERAGE GLUCOSE BLD GHB EST-MCNC: 128 MG/DL (ref 68–126)
FASTING PATIENT LIPID ANSWER: YES
FASTING STATUS PATIENT QL REPORTED: YES
GLOBULIN PLAS-MCNC: 2.8 G/DL (ref 2–3.5)
GLUCOSE BLD-MCNC: 77 MG/DL (ref 70–99)
HBA1C MFR BLD: 6.1 % (ref ?–5.7)
HCT VFR BLD AUTO: 38.5 %
HDLC SERPL-MCNC: 54 MG/DL (ref 40–59)
HGB BLD-MCNC: 12.8 G/DL
LDLC SERPL CALC-MCNC: 59 MG/DL (ref ?–100)
MCH RBC QN AUTO: 29.6 PG (ref 26–34)
MCHC RBC AUTO-ENTMCNC: 33.2 G/DL (ref 31–37)
MCV RBC AUTO: 89.1 FL
NONHDLC SERPL-MCNC: 75 MG/DL (ref ?–130)
OSMOLALITY SERPL CALC.SUM OF ELEC: 285 MOSM/KG (ref 275–295)
PLATELET # BLD AUTO: 287 10(3)UL (ref 150–450)
POTASSIUM SERPL-SCNC: 4 MMOL/L (ref 3.5–5.1)
PROT SERPL-MCNC: 7.7 G/DL (ref 5.7–8.2)
RBC # BLD AUTO: 4.32 X10(6)UL
SODIUM SERPL-SCNC: 138 MMOL/L (ref 136–145)
TRIGL SERPL-MCNC: 79 MG/DL (ref 30–149)
TSI SER-ACNC: 2.19 UIU/ML (ref 0.55–4.78)
VLDLC SERPL CALC-MCNC: 12 MG/DL (ref 0–30)
WBC # BLD AUTO: 8.6 X10(3) UL (ref 4–11)

## 2024-11-01 PROCEDURE — 85027 COMPLETE CBC AUTOMATED: CPT

## 2024-11-01 PROCEDURE — 84443 ASSAY THYROID STIM HORMONE: CPT

## 2024-11-01 PROCEDURE — 71271 CT THORAX LUNG CANCER SCR C-: CPT | Performed by: INTERNAL MEDICINE

## 2024-11-01 PROCEDURE — 83036 HEMOGLOBIN GLYCOSYLATED A1C: CPT

## 2024-11-01 PROCEDURE — 36415 COLL VENOUS BLD VENIPUNCTURE: CPT

## 2024-11-01 PROCEDURE — 80061 LIPID PANEL: CPT

## 2024-11-01 PROCEDURE — 80053 COMPREHEN METABOLIC PANEL: CPT

## 2024-11-04 DIAGNOSIS — D64.9 ANEMIA, UNSPECIFIED TYPE: Primary | ICD-10-CM

## 2025-01-16 DIAGNOSIS — Z86.73 HISTORY OF CVA (CEREBROVASCULAR ACCIDENT): ICD-10-CM

## 2025-01-20 RX ORDER — CLOPIDOGREL BISULFATE 75 MG/1
75 TABLET ORAL DAILY
Qty: 90 TABLET | Refills: 3 | Status: SHIPPED | OUTPATIENT
Start: 2025-01-20

## 2025-01-20 NOTE — TELEPHONE ENCOUNTER
Please Review. Protocol Failed; No Protocol     Requested Prescriptions   Pending Prescriptions Disp Refills    CLOPIDOGREL 75 MG Oral Tab [Pharmacy Med Name: CLOPIDOGREL BISULFATE TABS 75MG] 90 tablet 3     Sig: TAKE 1 TABLET DAILY       There is no refill protocol information for this order              Recent Outpatient Visits              2 months ago Essential hypertension    [facility] [place]    Office Visit    6 months ago Cervical stenosis of spinal canal    [facility]    [facility]    7 months ago Cervical radiculopathy    [facility]    Office Visit    10 months ago Right sided numbness    [facility]    Office Visit    11 months ago Physical exam, annual    [facility] [place]    Office Visit

## 2025-02-10 DIAGNOSIS — I10 ESSENTIAL HYPERTENSION: ICD-10-CM

## 2025-02-10 DIAGNOSIS — E78.00 HYPERCHOLESTEROLEMIA: ICD-10-CM

## 2025-02-10 DIAGNOSIS — Z86.73 HISTORY OF CVA (CEREBROVASCULAR ACCIDENT): ICD-10-CM

## 2025-02-13 RX ORDER — ATORVASTATIN CALCIUM 40 MG/1
40 TABLET, FILM COATED ORAL NIGHTLY
Qty: 90 TABLET | Refills: 3 | Status: SHIPPED | OUTPATIENT
Start: 2025-02-13

## 2025-02-13 RX ORDER — ASPIRIN 81 MG/1
81 TABLET ORAL DAILY
Qty: 90 TABLET | Refills: 3 | Status: SHIPPED | OUTPATIENT
Start: 2025-02-13 | End: 2025-05-13

## 2025-02-13 RX ORDER — LISINOPRIL AND HYDROCHLOROTHIAZIDE 20; 25 MG/1; MG/1
2 TABLET ORAL DAILY
Qty: 180 TABLET | Refills: 3 | Status: SHIPPED | OUTPATIENT
Start: 2025-02-13

## 2025-02-13 NOTE — TELEPHONE ENCOUNTER
Refill passed per [facility] protocol.     Requested Prescriptions   Pending Prescriptions Disp Refills    lisinopril-hydroCHLOROthiazide 20-25 MG Oral Tab 180 tablet 3     Sig: Take 2 tablets by mouth daily.       Hypertension Medications Protocol Passed - 2/13/2025  8:01 AM        Passed - CMP or BMP in past 12 months        Passed - Last BP reading less than 140/90     BP Readings from Last 1 Encounters:   10/29/24 127/74               Passed - In person appointment or virtual visit in the past 12 mos or appointment in next 3 mos     Recent Outpatient Visits              3 months ago Essential hypertension    [facility] [place]    Office Visit    7 months ago Cervical stenosis of spinal canal    [facility] [place]    Office Visit    8 months ago Cervical radiculopathy    [facility] [place]    Office Visit    11 months ago Right sided numbness    [facility] [place]    Office Visit    12 months ago Physical exam, annual    [facility] [place]    Office Visit                      Passed - EGFRCR or GFRNAA > 50     GFR Evaluation  EGFRCR: 77 , resulted on 11/1/2024          Passed - Medication is active on med list          atorvastatin 40 MG Oral Tab 90 tablet 3     Sig: Take 1 tablet (40 mg total) by mouth nightly.       Cholesterol Medication Protocol Passed - 2/13/2025  8:01 AM        Passed - ALT < 80     Lab Results   Component Value Date    ALT 16 11/01/2024             Passed - ALT resulted within past year        Passed - Lipid panel within past 12 months     Lab Results   Component Value Date    CHOLEST 129 11/01/2024    TRIG 79 11/01/2024    HDL 54 11/01/2024    LDL 59 11/01/2024    VLDL 12 11/01/2024    NONHDLC 75 11/01/2024              Passed - In person appointment or virtual visit in the past 12 mos or appointment in next 3 mos     Recent Outpatient Visits              3 months ago Essential hypertension    [facility], Leigh Jones MD    Office Visit    7 months ago Cervical stenosis of spinal canal    [facility]    [facility]    8 months ago Cervical radiculopathy    [facility], Ezequiel Mayers DO    Office Visit    11 months ago Right sided numbness    [facility]    Office Visit    12 months ago Physical exam, annual    [facility], Leigh Jones MD    Office Visit                      Passed - Medication is active on med list             [unfilled]      [unfilled]

## 2025-02-13 NOTE — TELEPHONE ENCOUNTER
Refill passed per [facility] protocol.     Requested Prescriptions   Pending Prescriptions Disp Refills    lisinopril-hydroCHLOROthiazide 20-25 MG Oral Tab 180 tablet 3     Sig: Take 2 tablets by mouth daily.       Hypertension Medications Protocol Passed - 2/13/2025  8:02 AM        Passed - CMP or BMP in past 12 months        Passed - Last BP reading less than 140/90     BP Readings from Last 1 Encounters:   10/29/24 127/74               Passed - In person appointment or virtual visit in the past 12 mos or appointment in next 3 mos     Recent Outpatient Visits              3 months ago Essential hypertension    [facility] [place]    Office Visit    7 months ago Cervical stenosis of spinal canal    [facility] [place]    Office Visit    8 months ago Cervical radiculopathy    [facility] [place]    Office Visit    11 months ago Right sided numbness    [facility] [place] [facility] [facility]    Office Visit    12 months ago Physical exam, annual    [facility] [place]    Office Visit                      Passed - EGFRCR or GFRNAA > 50     GFR Evaluation  EGFRCR: 77 , resulted on 11/1/2024          Passed - Medication is active on med list          atorvastatin 40 MG Oral Tab 90 tablet 3     Sig: Take 1 tablet (40 mg total) by mouth nightly.       Cholesterol Medication Protocol Passed - 2/13/2025  8:02 AM        Passed - ALT < 80     Lab Results   Component Value Date    ALT 16 11/01/2024             Passed - ALT resulted within past year        Passed - Lipid panel within past 12 months     Lab Results   Component Value Date    CHOLEST 129 11/01/2024    TRIG 79 11/01/2024    HDL 54 11/01/2024    LDL 59 11/01/2024    VLDL 12 11/01/2024    NONHDLC 75 11/01/2024              Passed - In person appointment or virtual visit in the past 12 mos or appointment in next 3 mos     Recent Outpatient Visits              3 months ago Essential hypertension    [facility], Leigh Jones MD    Office Visit    7 months ago Cervical stenosis of spinal canal    [facility]    [facility] Visit    8 months ago Cervical radiculopathy    [facility], Ezequiel Mayers DO    Office Visit    11 months ago Right sided numbness    [facility]    [facility] Visit    12 months ago Physical exam, annual    [facility], Leigh Jones MD    Office Visit                      Passed - Medication is active on med list             [unfilled]      [unfilled]

## 2025-02-13 NOTE — TELEPHONE ENCOUNTER
Refill passed per [facility] protocol.   Requested Prescriptions   Pending Prescriptions Disp Refills    aspirin 81 MG Oral Tab EC 90 tablet 3     Sig: Take 1 tablet (81 mg total) by mouth daily.       Aspirin Protocol Passed - 2/13/2025  8:29 AM        Passed - In person appointment or virtual visit in the past 6 mos or appointment in next 3 mos     Recent Outpatient Visits              3 months ago Essential hypertension    [facility], Leigh Jones MD    Office Visit    7 months ago Cervical stenosis of spinal canal    [facility], Major Lee MD    Office Visit    8 months ago Cervical radiculopathy    [facility] [place]    Office Visit    11 months ago Right sided numbness    [facility]    [facility] Visit    12 months ago Physical exam, annual    [facility], Leigh Jones MD    Office Visit                      Passed - Medication is active on med list            Recent Outpatient Visits              3 months ago Essential hypertension    [facility], Leigh Jones MD    Office Visit    7 months ago Cervical stenosis of spinal canal    [facility], Major Lee MD    Office Visit    8 months ago Cervical radiculopathy    [facility] Ezequiel Mayers DO    Office Visit    11 months ago Right sided numbness    [facility]    [facility]    12 months ago Physical exam, annual    [facility] Leigh Jones MD    Office Visit

## 2025-03-28 ENCOUNTER — TELEPHONE (OUTPATIENT)
Dept: INTERNAL MEDICINE CLINIC | Facility: CLINIC | Age: 65
End: 2025-03-28

## 2025-05-13 DIAGNOSIS — Z86.73 HISTORY OF CVA (CEREBROVASCULAR ACCIDENT): ICD-10-CM

## 2025-05-13 DIAGNOSIS — E78.00 HYPERCHOLESTEROLEMIA: ICD-10-CM

## 2025-05-13 DIAGNOSIS — I10 ESSENTIAL HYPERTENSION: ICD-10-CM

## 2025-05-14 RX ORDER — ASPIRIN 81 MG/1
81 TABLET ORAL DAILY
Qty: 90 TABLET | Refills: 0 | Status: SHIPPED | OUTPATIENT
Start: 2025-05-14

## 2025-05-14 RX ORDER — ATORVASTATIN CALCIUM 40 MG/1
40 TABLET, FILM COATED ORAL NIGHTLY
Qty: 90 TABLET | Refills: 3 | OUTPATIENT
Start: 2025-05-14

## 2025-05-14 RX ORDER — LISINOPRIL AND HYDROCHLOROTHIAZIDE 20; 25 MG/1; MG/1
2 TABLET ORAL DAILY
Qty: 180 TABLET | Refills: 3 | OUTPATIENT
Start: 2025-05-14

## 2025-06-10 ENCOUNTER — OFFICE VISIT (OUTPATIENT)
Dept: INTERNAL MEDICINE CLINIC | Facility: CLINIC | Age: 65
End: 2025-06-10
Payer: COMMERCIAL

## 2025-06-10 VITALS
OXYGEN SATURATION: 99 % | RESPIRATION RATE: 16 BRPM | SYSTOLIC BLOOD PRESSURE: 128 MMHG | DIASTOLIC BLOOD PRESSURE: 71 MMHG | BODY MASS INDEX: 23.16 KG/M2 | TEMPERATURE: 98 F | HEIGHT: 72 IN | HEART RATE: 65 BPM | WEIGHT: 171 LBS

## 2025-06-10 DIAGNOSIS — I10 ESSENTIAL HYPERTENSION: ICD-10-CM

## 2025-06-10 DIAGNOSIS — Z59.10 INADEQUATE HOUSING, UNSPECIFIED: ICD-10-CM

## 2025-06-10 DIAGNOSIS — F17.200 TOBACCO USE DISORDER: ICD-10-CM

## 2025-06-10 DIAGNOSIS — Z86.73 HISTORY OF CVA (CEREBROVASCULAR ACCIDENT): ICD-10-CM

## 2025-06-10 DIAGNOSIS — F32.A ANXIETY AND DEPRESSION: ICD-10-CM

## 2025-06-10 DIAGNOSIS — Z12.5 PROSTATE CANCER SCREENING: ICD-10-CM

## 2025-06-10 DIAGNOSIS — G81.91 RIGHT HEMIPARESIS (HCC): ICD-10-CM

## 2025-06-10 DIAGNOSIS — F41.9 ANXIETY AND DEPRESSION: ICD-10-CM

## 2025-06-10 DIAGNOSIS — Z00.00 PHYSICAL EXAM, ANNUAL: Primary | ICD-10-CM

## 2025-06-10 DIAGNOSIS — Z12.11 COLON CANCER SCREENING: ICD-10-CM

## 2025-06-10 DIAGNOSIS — R73.03 PREDIABETES: ICD-10-CM

## 2025-06-10 DIAGNOSIS — E78.00 HYPERCHOLESTEROLEMIA: ICD-10-CM

## 2025-06-10 PROCEDURE — 99397 PER PM REEVAL EST PAT 65+ YR: CPT | Performed by: NURSE PRACTITIONER

## 2025-06-10 SDOH — ECONOMIC STABILITY - HOUSING INSECURITY: INADEQUATE HOUSING UNSPECIFIED: Z59.10

## 2025-06-10 NOTE — PROGRESS NOTES
Jonnie Leon is a 64 year old male.  Chief Complaint   Patient presents with    Physical     HPI:   He presents for his annual physical exam. He has a history of anxiety (he is not currently on medication), HTN, prediabetes, history of CVA and hypercholesterolemia.     He experiences dizziness at times with head movements. He states he drinks enough water.     CVA in 2012 with right sided weakness and right sided numbness residual     Right VAS s/p carotid endarterectomy     Current tobacco user  1/2 pack a day since 15 years old  Lung screening - negative 11/1/2024    Current Medications[1]   Past Medical History[2]   Past Surgical History[3]   Social History:  Short Social Hx on File[4]   Family History[5]   Allergies[6]     REVIEW OF SYSTEMS:     Review of Systems   Constitutional:  Negative for fever.   HENT: Negative.     Respiratory:  Negative for cough, shortness of breath and wheezing.    Cardiovascular:  Negative for chest pain.   Gastrointestinal: Negative.  Negative for abdominal pain and blood in stool.   Genitourinary: Negative.    Musculoskeletal: Negative.    Skin: Negative.    Neurological:  Positive for dizziness.   Psychiatric/Behavioral: Negative.        Wt Readings from Last 5 Encounters:   06/10/25 171 lb (77.6 kg)   10/29/24 172 lb (78 kg)   07/01/24 170 lb (77.1 kg)   06/13/24 173 lb (78.5 kg)   02/19/24 173 lb (78.5 kg)     Body mass index is 23.19 kg/m².      EXAM:   /71 (BP Location: Right arm, Patient Position: Sitting, Cuff Size: large)   Pulse 65   Temp 98 °F (36.7 °C) (Temporal)   Resp 16   Ht 6' (1.829 m)   Wt 171 lb (77.6 kg)   SpO2 99%   BMI 23.19 kg/m²     Physical Exam  Vitals reviewed.   Constitutional:       Appearance: Normal appearance.   HENT:      Head: Normocephalic.      Right Ear: Tympanic membrane normal.      Left Ear: Tympanic membrane normal.      Nose: No congestion.   Eyes:      Extraocular Movements: Extraocular movements intact.      Pupils:  Pupils are equal, round, and reactive to light.   Cardiovascular:      Rate and Rhythm: Normal rate and regular rhythm.      Pulses: Normal pulses.   Pulmonary:      Breath sounds: Normal breath sounds. No wheezing.   Abdominal:      General: Bowel sounds are normal.      Palpations: Abdomen is soft.      Tenderness: There is no abdominal tenderness.   Musculoskeletal:         General: No swelling. Normal range of motion.      Cervical back: Normal range of motion and neck supple.   Skin:     General: Skin is warm and dry.   Neurological:      Mental Status: He is alert and oriented to person, place, and time.   Psychiatric:         Mood and Affect: Mood normal.         Behavior: Behavior normal.            ASSESSMENT AND PLAN:   1. Physical exam, annual  - CBC With Differential With Platelet; Future  - Comp Metabolic Panel (14); Future  - Hemoglobin A1C [E]; Future    2. Essential hypertension  - BP stable   - CPM    3. Hypercholesterolemia  - continue atorvastatin 40mg as prescribed   - Lipid Panel [E]; Future    4. Colon cancer screening  - [facility] Telephone Colon Screen; Future    5. Prostate cancer screening  - PSA (Screening) [E]; Future    6. Tobacco use disorder  - CT lung screening completed 11/2024 no evidence of primary lung cancer   - 1/2 pack a day since 15 years old    7. History of CVA (cerebrovascular accident)  - continue ASA, plavix and statin     8. Right hemiparesis (HCC)  - secondary to #7    9. Prediabetes  - Hemoglobin A1C [E]; Future    10. Anxiety and depression  - stable    11. Inadequate housing, unspecified      The patient indicates understanding of these issues and agrees to the plan.  Return in about 1 year (around 6/10/2026).         [1]   Current Outpatient Medications   Medication Sig Dispense Refill    aspirin 81 MG Oral Tab EC Take 1 tablet (81 mg total) by mouth daily. 90 tablet 0    lisinopril-hydroCHLOROthiazide 20-25 MG Oral Tab Take 2 tablets by mouth daily. 180 tablet 3     atorvastatin 40 MG Oral Tab Take 1 tablet (40 mg total) by mouth nightly. 90 tablet 3    clopidogrel 75 MG Oral Tab Take 1 tablet (75 mg total) by mouth daily. 90 tablet 3   [2]   Past Medical History:   Acute, but ill-defined, cerebrovascular disease    Anxiety    Anxiety state    Calculus of kidney    Dehydration    Depression    Essential hypertension    Gastroenteritis    High blood pressure    High cholesterol    Lipid screening    Per [facility]    Muscle weakness    right sided weakness after CVA    Stroke (HCC)    right side weakness    Visual impairment   [3]   Past Surgical History:  Procedure Laterality Date    Arthroscopy of joint unlisted Right knee    Back surgery  1991    Colonoscopy  07/09/2010    Per [facility]    Cystoscopy,insert ureteral stent Right 03/09/2021    Dr. Moss @ [facility]    Cystouretro w/stone remove Right 03/26/2021    Dr. Moss @ [facility]    Musculoskeletal surgery unlisted Right 2008    Right shoulder surgery    Other surgical history  knee,shoulder,neck    Tonsillectomy  child   [4]   Social History  Socioeconomic History    Marital status:    Tobacco Use    Smoking status: Every Day     Current packs/day: 0.50     Average packs/day: 0.5 packs/day for 45.0 years (22.5 ttl pk-yrs)     Types: Cigarettes    Smokeless tobacco: Never   Vaping Use    Vaping status: Former   Substance and Sexual Activity    Alcohol use: Yes     Alcohol/week: 2.0 standard drinks of alcohol     Types: 2 Cans of beer per week     Comment: last drink a week ago    Drug use: Yes     Frequency: 1.0 times per week     Types: Cannabis   Other Topics Concern    Caffeine Concern Yes     Comment: Coffee, > 6 cups daily     Social Drivers of Health     Food Insecurity: No Food Insecurity (6/10/2025)    NCSS - Food Insecurity     Worried About Running Out of Food in the Last Year: No     Ran Out of Food in the Last Year: No   Transportation Needs: No Transportation Needs (6/10/2025)    NCSS - Transportation     Lack of  Transportation: No   Housing Stability: At Risk (6/10/2025)    NCSS - Housing/Utilities     Has Housing: Yes     Worried About Losing Housing: Yes     Unable to Get Utilities: No   [5]   Family History  Problem Relation Age of Onset    No Known Problems Father     Colon Cancer Mother 72    Diabetes Mother     Cancer Sister 54        Cancer - lymphoma   [6] No Known Allergies

## 2025-06-12 ENCOUNTER — LAB ENCOUNTER (OUTPATIENT)
Dept: LAB | Age: 65
End: 2025-06-12
Attending: NURSE PRACTITIONER
Payer: COMMERCIAL

## 2025-06-12 DIAGNOSIS — Z12.5 PROSTATE CANCER SCREENING: ICD-10-CM

## 2025-06-12 DIAGNOSIS — Z00.00 PHYSICAL EXAM, ANNUAL: ICD-10-CM

## 2025-06-12 DIAGNOSIS — R73.03 PREDIABETES: ICD-10-CM

## 2025-06-12 DIAGNOSIS — E78.00 HYPERCHOLESTEROLEMIA: ICD-10-CM

## 2025-06-12 LAB
ALBUMIN SERPL-MCNC: 4.8 G/DL (ref 3.2–4.8)
ALBUMIN/GLOB SERPL: 2.2 {RATIO} (ref 1–2)
ALP LIVER SERPL-CCNC: 85 U/L (ref 45–117)
ALT SERPL-CCNC: 16 U/L (ref 10–49)
ANION GAP SERPL CALC-SCNC: 6 MMOL/L (ref 0–18)
AST SERPL-CCNC: 18 U/L (ref ?–34)
BASOPHILS # BLD AUTO: 0.05 X10(3) UL (ref 0–0.2)
BASOPHILS NFR BLD AUTO: 0.4 %
BILIRUB SERPL-MCNC: 0.5 MG/DL (ref 0.2–1.1)
BUN BLD-MCNC: 22 MG/DL (ref 9–23)
BUN/CREAT SERPL: 21.6 (ref 10–20)
CALCIUM BLD-MCNC: 9.6 MG/DL (ref 8.7–10.4)
CHLORIDE SERPL-SCNC: 107 MMOL/L (ref 98–112)
CHOLEST SERPL-MCNC: 117 MG/DL (ref ?–200)
CO2 SERPL-SCNC: 26 MMOL/L (ref 21–32)
COMPLEXED PSA SERPL-MCNC: 1.43 NG/ML (ref ?–4)
CREAT BLD-MCNC: 1.02 MG/DL (ref 0.7–1.3)
DEPRECATED RDW RBC AUTO: 45.1 FL (ref 35.1–46.3)
EGFRCR SERPLBLD CKD-EPI 2021: 82 ML/MIN/1.73M2 (ref 60–?)
EOSINOPHIL # BLD AUTO: 0.31 X10(3) UL (ref 0–0.7)
EOSINOPHIL NFR BLD AUTO: 2.5 %
ERYTHROCYTE [DISTWIDTH] IN BLOOD BY AUTOMATED COUNT: 13.2 % (ref 11–15)
EST. AVERAGE GLUCOSE BLD GHB EST-MCNC: 128 MG/DL (ref 68–126)
FASTING PATIENT LIPID ANSWER: YES
FASTING STATUS PATIENT QL REPORTED: YES
GLOBULIN PLAS-MCNC: 2.2 G/DL (ref 2–3.5)
GLUCOSE BLD-MCNC: 107 MG/DL (ref 70–99)
HBA1C MFR BLD: 6.1 % (ref ?–5.7)
HCT VFR BLD AUTO: 41.6 % (ref 39–53)
HDLC SERPL-MCNC: 50 MG/DL (ref 40–59)
HGB BLD-MCNC: 13.7 G/DL (ref 13–17.5)
IMM GRANULOCYTES # BLD AUTO: 0.04 X10(3) UL (ref 0–1)
IMM GRANULOCYTES NFR BLD: 0.3 %
LDLC SERPL CALC-MCNC: 51 MG/DL (ref ?–100)
LYMPHOCYTES # BLD AUTO: 1.45 X10(3) UL (ref 1–4)
LYMPHOCYTES NFR BLD AUTO: 11.6 %
MCH RBC QN AUTO: 30.4 PG (ref 26–34)
MCHC RBC AUTO-ENTMCNC: 32.9 G/DL (ref 31–37)
MCV RBC AUTO: 92.4 FL (ref 80–100)
MONOCYTES # BLD AUTO: 0.66 X10(3) UL (ref 0.1–1)
MONOCYTES NFR BLD AUTO: 5.3 %
NEUTROPHILS # BLD AUTO: 9.94 X10 (3) UL (ref 1.5–7.7)
NEUTROPHILS # BLD AUTO: 9.94 X10(3) UL (ref 1.5–7.7)
NEUTROPHILS NFR BLD AUTO: 79.9 %
NONHDLC SERPL-MCNC: 67 MG/DL (ref ?–130)
OSMOLALITY SERPL CALC.SUM OF ELEC: 292 MOSM/KG (ref 275–295)
PLATELET # BLD AUTO: 272 10(3)UL (ref 150–450)
POTASSIUM SERPL-SCNC: 4.3 MMOL/L (ref 3.5–5.1)
PROT SERPL-MCNC: 7 G/DL (ref 5.7–8.2)
RBC # BLD AUTO: 4.5 X10(6)UL (ref 3.8–5.8)
SODIUM SERPL-SCNC: 139 MMOL/L (ref 136–145)
TRIGL SERPL-MCNC: 77 MG/DL (ref 30–149)
VLDLC SERPL CALC-MCNC: 11 MG/DL (ref 0–30)
WBC # BLD AUTO: 12.5 X10(3) UL (ref 4–11)

## 2025-06-12 PROCEDURE — 36415 COLL VENOUS BLD VENIPUNCTURE: CPT

## 2025-06-12 PROCEDURE — 80053 COMPREHEN METABOLIC PANEL: CPT

## 2025-06-12 PROCEDURE — 85025 COMPLETE CBC W/AUTO DIFF WBC: CPT

## 2025-06-12 PROCEDURE — 83036 HEMOGLOBIN GLYCOSYLATED A1C: CPT

## 2025-06-12 PROCEDURE — 80061 LIPID PANEL: CPT

## 2025-06-23 ENCOUNTER — TELEPHONE (OUTPATIENT)
Dept: INTERNAL MEDICINE CLINIC | Facility: CLINIC | Age: 65
End: 2025-06-23

## (undated) DEVICE — SOL H2O 1000ML BTL

## (undated) DEVICE — CYSTO PACK: Brand: MEDLINE INDUSTRIES, INC.

## (undated) DEVICE — Device: Brand: JELCO

## (undated) DEVICE — DUAL LUMEN CATHETER

## (undated) DEVICE — 3M™ IOBAN™ 2 ANTIMICROBIAL INCISE DRAPE 6650EZ: Brand: IOBAN™ 2

## (undated) DEVICE — 12 ML SYRINGE LUER-LOCK TIP: Brand: MONOJECT

## (undated) DEVICE — CV: Brand: MEDLINE INDUSTRIES, INC.

## (undated) DEVICE — 60 ML SYRINGE LUER-LOCK TIP: Brand: MONOJECT

## (undated) DEVICE — EYE PADSSTERILENOT MADE WITH NATURAL RUBBER LATEXSINGLE USE ONLYDO NOT USE IF PACKAGE OPENED OR DAMAGED: Brand: CARDINAL HEALTH

## (undated) DEVICE — MEDI-VAC NON-CONDUCTIVE SUCTION TUBING: Brand: CARDINAL HEALTH

## (undated) DEVICE — SUTURE CHROMIC 2-0 801H

## (undated) DEVICE — 3M™ STERI-STRIP™ COMPOUND BENZOIN TINCTURE 40 BAGS/CARTON 4 CARTONS/CASE C1544: Brand: 3M™ STERI-STRIP™

## (undated) DEVICE — GAMMEX® PI HYBRID SIZE 7.5, STERILE POWDER-FREE SURGICAL GLOVE, POLYISOPRENE AND NEOPRENE BLEND: Brand: GAMMEX

## (undated) DEVICE — ABSORBABLE HEMOSTAT (OXIDIZED REGENERATED CELLULOSE, U.S.P.): Brand: SURGICEL

## (undated) DEVICE — UROLOGY DRAIN BAG

## (undated) DEVICE — SUTURE SILK 4-0 SA63H

## (undated) DEVICE — NEEDLE HPO 18GA 1.5IN ECLPS

## (undated) DEVICE — SOL  .9 1000ML BAG

## (undated) DEVICE — SUCTION CANISTER, 3000CC,SAFELINER: Brand: DEROYAL

## (undated) DEVICE — SUTURE MONOCRYL 4-0 Y845G

## (undated) DEVICE — SOL  .9 1000ML BTL

## (undated) DEVICE — ENDOSCOPIC VALVE WITH ADAPTER.: Brand: SURSEAL® II

## (undated) DEVICE — BARD® JAVID™ CAROTID BYPASS SHUNT, 17F - 10F X 27.5CM: Brand: BARD® JAVID

## (undated) DEVICE — TOWEL SURG OR 17X30IN BLUE

## (undated) DEVICE — SUTURE SILK 2-0 FS

## (undated) DEVICE — FORESIGHT LARGE SENSOR: Brand: FORESIGHT

## (undated) DEVICE — SOLO FLEX HYBRID GUIDEWIRE .03

## (undated) DEVICE — CHLORAPREP 26ML APPLICATOR

## (undated) DEVICE — CLIP SM W INTNL HRZN TI TPE LF

## (undated) DEVICE — SUTURE SILK 2-0 SA65H

## (undated) DEVICE — DRESSING FM 4.25X4.25IN PLMM

## (undated) DEVICE — DRAIN INCS 7MM 20CMX7MM SIL

## (undated) DEVICE — ENCORE® LATEX MICRO SIZE 8.5, STERILE LATEX POWDER-FREE SURGICAL GLOVE: Brand: ENCORE

## (undated) DEVICE — SUTURE PROLENE 6-0 BV-1

## (undated) DEVICE — ENCORE® LATEX ACCLAIM SIZE 8, STERILE LATEX POWDER-FREE SURGICAL GLOVE: Brand: ENCORE

## (undated) DEVICE — OPEN-END URETERAL CATHETER SOF-FLEX: Brand: SOF-FLEX

## (undated) DEVICE — SOL  .9 3000ML

## (undated) DEVICE — ISOVUE 300 10X100ML VIAL

## (undated) DEVICE — COVER SGL STRL LGHT HNDL BLU

## (undated) DEVICE — 6 ML SYRINGE LUER-LOCK TIP: Brand: MONOJECT

## (undated) DEVICE — 9534HP TRANSPARENT DRSG W/FRAME: Brand: 3M™ TEGADERM™

## (undated) DEVICE — 3M™ TEGADERM™ TRANSPARENT FILM DRESSING, 1626W, 4 IN X 4-3/4 IN (10 CM X 12 CM), 50 EACH/CARTON, 4 CARTON/CASE: Brand: 3M™ TEGADERM™

## (undated) DEVICE — NITINOL STONE RETRIEVAL BASKET: Brand: ZERO TIP

## (undated) DEVICE — DRAIN RELIAVAC 100CC

## (undated) DEVICE — TIGERTAIL 5F FLXTIP 70CM

## (undated) NOTE — LETTER
Date & Time: 3/8/2021, 4:43 AM  Patient: Ethan McDermitt  Encounter Provider(s):    Whitney Catalan DO       To Whom It May Concern:    Raven Millna was seen and treated in our department on 3/8/2021.  He should not return to work until 03/08/21

## (undated) NOTE — LETTER
No referring provider defined for this encounter.       03/14/24        Patient: Jonnie Leon   YOB: 1960   Date of Visit: 3/14/2024       Dear  Dr. Sydni Glass,      Thank you for referring Jonnie Leon to my practice.  Please find my assessment and plan below.      He has a cervical radiculopathy. He needs a MRI of his c spine and PT.              Sincerely,   Ezequiel Dennis DO   [address] [id number]    Document electronically generated by:  Ezequiel Dennis DO

## (undated) NOTE — LETTER
3/10/2020              [address]        [place] [address]         Dear Torri Kennedy,    This letter is to inform you that our office has made several attempts to reach you by phone and have left numerous messages without re

## (undated) NOTE — MR AVS SNAPSHOT
[address]  [phone]               Thank you for choosing us for your health care visit with Annabella Foley MD.  We are glad to serve you and happy to provide you with this summary Sign up for [employer], your secure online medical record. [employer] will allow you to access patient instructions from your recent visit,  view other health information, and more. To sign up or find more information, go to [email or web address]

## (undated) NOTE — LETTER
12/31/2020              [facility] [address]        [place] [address]         Dear Hair Medina,    This letter is to inform you that our office has made several attempts to reach you by phone without success.   We were attempting to c

## (undated) NOTE — MR AVS SNAPSHOT
[address]  [address]  [phone]               Thank you for choosing us for your health care visit with Annabella Foley MD.  We are glad to serve you and happy to provide you with this summary Now link in the [employer] [place] [employer]. Enter your [employer] Activation Code exactly as it appears below along with your Zip Code and Date of Birth to complete the sign-up process. If you do not sign up before the expiration date, you must request a new code.     Annalisa Clifton

## (undated) NOTE — MR AVS SNAPSHOT
[facility] SPECIALTY [facility] [place] [address]  [id number] [phone]               Thank you for choosing us for your health care visit with Chad Cedillo MD.  We are glad to serve you and happy to provide you with this summary o This list is accurate as of: 3/13/17 12:22 PM.  Always use your most recent med list.                Clopidogrel Bisulfate 75 MG Tabs   Take 1 tablet (75 mg total) by mouth daily. What changed:  See the new instructions.    Commonly known as:  PLAVIX ? Install grab bars on the bathroom walls beside the tub, shower and toilet. ? Use a non skid rubber mat in the tub/shower. ? If you are unsteady on your feet you may want to use a shower chair/bench and a hand held shower head while bathing/showering. Eat plenty of low-fat dairy products High fat meats and dairy   Choose whole grain products Foods high in sodium   Water is best for hydration Fast food.    Eat at home when possible     Tips for increasing your physical activity – Adults who are physically

## (undated) NOTE — LETTER
[address] [place] [address]  Authorization for Invasive Procedures  1.  I hereby authorize Dr. Lauren Douglas , my physician and whomever may be designated as the doctor's assistant, to perform the following operation and/or procedure: c some, but not all, of the potential risks that can occur: fever and allergic reactions, hemolytic reactions, transmission of disease such as hepatitis, AIDS, cytomegalovirus (CMV), and flluid overload.  In the event that I wish to have autologous transfusio necessary or desirable in the judgment of my physician.      Signature of Patient:  ________________________________________________ Date: _________Time: _________    Responsible person in case of minor or unconscious: _____________________________Relations